# Patient Record
Sex: MALE | Race: OTHER | HISPANIC OR LATINO | URBAN - METROPOLITAN AREA
[De-identification: names, ages, dates, MRNs, and addresses within clinical notes are randomized per-mention and may not be internally consistent; named-entity substitution may affect disease eponyms.]

---
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
## 2022-01-05 ENCOUNTER — APPOINTMENT (OUTPATIENT)
Dept: PSYCHIATRY | Facility: CLINIC | Age: 22
End: 2022-01-05

## 2022-01-11 ENCOUNTER — APPOINTMENT (OUTPATIENT)
Dept: PSYCHIATRY | Facility: CLINIC | Age: 22
End: 2022-01-11

## 2022-01-19 ENCOUNTER — APPOINTMENT (OUTPATIENT)
Dept: PSYCHIATRY | Facility: CLINIC | Age: 22
End: 2022-01-19

## 2022-01-21 ENCOUNTER — OUTPATIENT (OUTPATIENT)
Dept: OUTPATIENT SERVICES | Facility: HOSPITAL | Age: 22
LOS: 1 days | Discharge: HOME | End: 2022-01-21

## 2022-01-21 ENCOUNTER — APPOINTMENT (OUTPATIENT)
Dept: PSYCHIATRY | Facility: CLINIC | Age: 22
End: 2022-01-21

## 2022-01-21 DIAGNOSIS — F20.9 SCHIZOPHRENIA, UNSPECIFIED: ICD-10-CM

## 2022-01-25 ENCOUNTER — APPOINTMENT (OUTPATIENT)
Dept: PSYCHIATRY | Facility: CLINIC | Age: 22
End: 2022-01-25

## 2022-02-01 ENCOUNTER — APPOINTMENT (OUTPATIENT)
Dept: PSYCHIATRY | Facility: CLINIC | Age: 22
End: 2022-02-01

## 2022-02-09 ENCOUNTER — OUTPATIENT (OUTPATIENT)
Dept: ADMINISTRATIVE | Facility: HOSPITAL | Age: 22
LOS: 1 days | Discharge: HOME | End: 2022-02-09

## 2022-02-09 ENCOUNTER — APPOINTMENT (OUTPATIENT)
Dept: PSYCHIATRY | Facility: CLINIC | Age: 22
End: 2022-02-09

## 2022-02-10 ENCOUNTER — APPOINTMENT (OUTPATIENT)
Dept: PSYCHIATRY | Facility: CLINIC | Age: 22
End: 2022-02-10

## 2022-02-14 ENCOUNTER — APPOINTMENT (OUTPATIENT)
Dept: PSYCHIATRY | Facility: CLINIC | Age: 22
End: 2022-02-14

## 2022-02-14 ENCOUNTER — OUTPATIENT (OUTPATIENT)
Dept: OUTPATIENT SERVICES | Facility: HOSPITAL | Age: 22
LOS: 1 days | Discharge: HOME | End: 2022-02-14

## 2022-02-14 DIAGNOSIS — F20.9 SCHIZOPHRENIA, UNSPECIFIED: ICD-10-CM

## 2022-02-15 DIAGNOSIS — F20.9 SCHIZOPHRENIA, UNSPECIFIED: ICD-10-CM

## 2022-02-17 ENCOUNTER — APPOINTMENT (OUTPATIENT)
Dept: PSYCHIATRY | Facility: CLINIC | Age: 22
End: 2022-02-17

## 2022-02-23 ENCOUNTER — APPOINTMENT (OUTPATIENT)
Dept: PSYCHIATRY | Facility: CLINIC | Age: 22
End: 2022-02-23

## 2022-02-28 ENCOUNTER — APPOINTMENT (OUTPATIENT)
Dept: PSYCHIATRY | Facility: CLINIC | Age: 22
End: 2022-02-28

## 2022-03-01 ENCOUNTER — APPOINTMENT (OUTPATIENT)
Dept: PSYCHIATRY | Facility: CLINIC | Age: 22
End: 2022-03-01

## 2022-03-01 ENCOUNTER — OUTPATIENT (OUTPATIENT)
Dept: OUTPATIENT SERVICES | Facility: HOSPITAL | Age: 22
LOS: 1 days | Discharge: HOME | End: 2022-03-01

## 2022-03-01 DIAGNOSIS — F20.9 SCHIZOPHRENIA, UNSPECIFIED: ICD-10-CM

## 2022-03-08 ENCOUNTER — OUTPATIENT (OUTPATIENT)
Dept: OUTPATIENT SERVICES | Facility: HOSPITAL | Age: 22
LOS: 1 days | Discharge: HOME | End: 2022-03-08

## 2022-03-08 ENCOUNTER — APPOINTMENT (OUTPATIENT)
Dept: PSYCHIATRY | Facility: CLINIC | Age: 22
End: 2022-03-08

## 2022-03-08 DIAGNOSIS — F20.9 SCHIZOPHRENIA, UNSPECIFIED: ICD-10-CM

## 2022-03-09 ENCOUNTER — APPOINTMENT (OUTPATIENT)
Dept: PSYCHIATRY | Facility: CLINIC | Age: 22
End: 2022-03-09

## 2022-03-15 ENCOUNTER — APPOINTMENT (OUTPATIENT)
Dept: PSYCHIATRY | Facility: CLINIC | Age: 22
End: 2022-03-15

## 2022-03-16 ENCOUNTER — APPOINTMENT (OUTPATIENT)
Dept: PSYCHIATRY | Facility: CLINIC | Age: 22
End: 2022-03-16

## 2022-03-21 ENCOUNTER — APPOINTMENT (OUTPATIENT)
Dept: PSYCHIATRY | Facility: CLINIC | Age: 22
End: 2022-03-21

## 2022-03-21 ENCOUNTER — OUTPATIENT (OUTPATIENT)
Dept: OUTPATIENT SERVICES | Facility: HOSPITAL | Age: 22
LOS: 1 days | Discharge: HOME | End: 2022-03-21

## 2022-03-21 DIAGNOSIS — F20.9 SCHIZOPHRENIA, UNSPECIFIED: ICD-10-CM

## 2022-03-23 ENCOUNTER — APPOINTMENT (OUTPATIENT)
Dept: PSYCHIATRY | Facility: CLINIC | Age: 22
End: 2022-03-23

## 2022-03-29 ENCOUNTER — APPOINTMENT (OUTPATIENT)
Dept: PSYCHIATRY | Facility: CLINIC | Age: 22
End: 2022-03-29

## 2022-03-30 ENCOUNTER — APPOINTMENT (OUTPATIENT)
Dept: PSYCHIATRY | Facility: CLINIC | Age: 22
End: 2022-03-30

## 2022-03-31 ENCOUNTER — APPOINTMENT (OUTPATIENT)
Dept: PSYCHIATRY | Facility: CLINIC | Age: 22
End: 2022-03-31
Payer: COMMERCIAL

## 2022-03-31 ENCOUNTER — OUTPATIENT (OUTPATIENT)
Dept: OUTPATIENT SERVICES | Facility: HOSPITAL | Age: 22
LOS: 1 days | Discharge: HOME | End: 2022-03-31

## 2022-03-31 DIAGNOSIS — F20.9 SCHIZOPHRENIA, UNSPECIFIED: ICD-10-CM

## 2022-03-31 PROCEDURE — 99214 OFFICE O/P EST MOD 30 MIN: CPT | Mod: 95

## 2022-04-05 ENCOUNTER — OUTPATIENT (OUTPATIENT)
Dept: OUTPATIENT SERVICES | Facility: HOSPITAL | Age: 22
LOS: 1 days | Discharge: HOME | End: 2022-04-05

## 2022-04-05 ENCOUNTER — APPOINTMENT (OUTPATIENT)
Dept: PSYCHIATRY | Facility: CLINIC | Age: 22
End: 2022-04-05

## 2022-04-05 DIAGNOSIS — F20.9 SCHIZOPHRENIA, UNSPECIFIED: ICD-10-CM

## 2022-04-06 ENCOUNTER — APPOINTMENT (OUTPATIENT)
Dept: PSYCHIATRY | Facility: CLINIC | Age: 22
End: 2022-04-06

## 2022-04-12 ENCOUNTER — APPOINTMENT (OUTPATIENT)
Dept: PSYCHIATRY | Facility: CLINIC | Age: 22
End: 2022-04-12

## 2022-04-13 ENCOUNTER — APPOINTMENT (OUTPATIENT)
Dept: PSYCHIATRY | Facility: CLINIC | Age: 22
End: 2022-04-13

## 2022-04-13 ENCOUNTER — NON-APPOINTMENT (OUTPATIENT)
Age: 22
End: 2022-04-13

## 2022-04-14 ENCOUNTER — APPOINTMENT (OUTPATIENT)
Dept: PSYCHIATRY | Facility: CLINIC | Age: 22
End: 2022-04-14

## 2022-04-21 ENCOUNTER — APPOINTMENT (OUTPATIENT)
Dept: PSYCHIATRY | Facility: CLINIC | Age: 22
End: 2022-04-21

## 2022-05-05 ENCOUNTER — APPOINTMENT (OUTPATIENT)
Dept: PSYCHIATRY | Facility: CLINIC | Age: 22
End: 2022-05-05

## 2022-05-06 ENCOUNTER — APPOINTMENT (OUTPATIENT)
Dept: PSYCHIATRY | Facility: CLINIC | Age: 22
End: 2022-05-06

## 2022-05-26 ENCOUNTER — APPOINTMENT (OUTPATIENT)
Dept: PSYCHIATRY | Facility: CLINIC | Age: 22
End: 2022-05-26

## 2022-05-26 ENCOUNTER — OUTPATIENT (OUTPATIENT)
Dept: OUTPATIENT SERVICES | Facility: HOSPITAL | Age: 22
LOS: 1 days | Discharge: HOME | End: 2022-05-26

## 2022-05-26 ENCOUNTER — NON-APPOINTMENT (OUTPATIENT)
Age: 22
End: 2022-05-26

## 2022-05-26 DIAGNOSIS — F20.9 SCHIZOPHRENIA, UNSPECIFIED: ICD-10-CM

## 2022-06-20 ENCOUNTER — APPOINTMENT (OUTPATIENT)
Dept: PSYCHIATRY | Facility: CLINIC | Age: 22
End: 2022-06-20

## 2022-06-20 ENCOUNTER — OUTPATIENT (OUTPATIENT)
Dept: OUTPATIENT SERVICES | Facility: HOSPITAL | Age: 22
LOS: 1 days | Discharge: HOME | End: 2022-06-20

## 2022-06-21 DIAGNOSIS — F20.9 SCHIZOPHRENIA, UNSPECIFIED: ICD-10-CM

## 2022-07-06 ENCOUNTER — NON-APPOINTMENT (OUTPATIENT)
Age: 22
End: 2022-07-06

## 2022-07-06 ENCOUNTER — APPOINTMENT (OUTPATIENT)
Dept: PSYCHIATRY | Facility: CLINIC | Age: 22
End: 2022-07-06

## 2022-07-06 ENCOUNTER — OUTPATIENT (OUTPATIENT)
Dept: OUTPATIENT SERVICES | Facility: HOSPITAL | Age: 22
LOS: 1 days | Discharge: HOME | End: 2022-07-06

## 2022-07-06 DIAGNOSIS — F20.9 SCHIZOPHRENIA, UNSPECIFIED: ICD-10-CM

## 2022-07-06 PROCEDURE — 99214 OFFICE O/P EST MOD 30 MIN: CPT | Mod: 95

## 2022-07-07 ENCOUNTER — OUTPATIENT (OUTPATIENT)
Dept: OUTPATIENT SERVICES | Facility: HOSPITAL | Age: 22
LOS: 1 days | Discharge: HOME | End: 2022-07-07

## 2022-07-07 ENCOUNTER — APPOINTMENT (OUTPATIENT)
Dept: PSYCHIATRY | Facility: CLINIC | Age: 22
End: 2022-07-07

## 2022-07-07 DIAGNOSIS — F20.9 SCHIZOPHRENIA, UNSPECIFIED: ICD-10-CM

## 2022-07-14 ENCOUNTER — OUTPATIENT (OUTPATIENT)
Dept: OUTPATIENT SERVICES | Facility: HOSPITAL | Age: 22
LOS: 1 days | Discharge: HOME | End: 2022-07-14

## 2022-07-14 ENCOUNTER — APPOINTMENT (OUTPATIENT)
Dept: PSYCHIATRY | Facility: CLINIC | Age: 22
End: 2022-07-14

## 2022-07-14 DIAGNOSIS — F20.9 SCHIZOPHRENIA, UNSPECIFIED: ICD-10-CM

## 2022-07-15 ENCOUNTER — APPOINTMENT (OUTPATIENT)
Dept: PSYCHIATRY | Facility: CLINIC | Age: 22
End: 2022-07-15

## 2022-07-15 ENCOUNTER — OUTPATIENT (OUTPATIENT)
Dept: OUTPATIENT SERVICES | Facility: HOSPITAL | Age: 22
LOS: 1 days | Discharge: HOME | End: 2022-07-15

## 2022-07-15 DIAGNOSIS — F20.9 SCHIZOPHRENIA, UNSPECIFIED: ICD-10-CM

## 2022-07-18 ENCOUNTER — APPOINTMENT (OUTPATIENT)
Dept: PSYCHIATRY | Facility: CLINIC | Age: 22
End: 2022-07-18

## 2022-07-18 ENCOUNTER — OUTPATIENT (OUTPATIENT)
Dept: OUTPATIENT SERVICES | Facility: HOSPITAL | Age: 22
LOS: 1 days | Discharge: HOME | End: 2022-07-18

## 2022-07-18 DIAGNOSIS — F20.9 SCHIZOPHRENIA, UNSPECIFIED: ICD-10-CM

## 2022-07-19 ENCOUNTER — NON-APPOINTMENT (OUTPATIENT)
Age: 22
End: 2022-07-19

## 2022-07-20 ENCOUNTER — NON-APPOINTMENT (OUTPATIENT)
Age: 22
End: 2022-07-20

## 2022-07-21 ENCOUNTER — NON-APPOINTMENT (OUTPATIENT)
Age: 22
End: 2022-07-21

## 2022-07-21 ENCOUNTER — APPOINTMENT (OUTPATIENT)
Dept: PSYCHIATRY | Facility: CLINIC | Age: 22
End: 2022-07-21

## 2022-07-25 ENCOUNTER — APPOINTMENT (OUTPATIENT)
Dept: PSYCHIATRY | Facility: CLINIC | Age: 22
End: 2022-07-25

## 2022-07-28 ENCOUNTER — OUTPATIENT (OUTPATIENT)
Dept: OUTPATIENT SERVICES | Facility: HOSPITAL | Age: 22
LOS: 1 days | Discharge: HOME | End: 2022-07-28

## 2022-07-28 ENCOUNTER — APPOINTMENT (OUTPATIENT)
Dept: PSYCHIATRY | Facility: CLINIC | Age: 22
End: 2022-07-28

## 2022-07-28 DIAGNOSIS — F20.9 SCHIZOPHRENIA, UNSPECIFIED: ICD-10-CM

## 2022-07-28 PROCEDURE — 99214 OFFICE O/P EST MOD 30 MIN: CPT | Mod: 95,GC

## 2022-07-29 ENCOUNTER — OUTPATIENT (OUTPATIENT)
Dept: OUTPATIENT SERVICES | Facility: HOSPITAL | Age: 22
LOS: 1 days | Discharge: HOME | End: 2022-07-29

## 2022-07-29 ENCOUNTER — APPOINTMENT (OUTPATIENT)
Dept: PSYCHIATRY | Facility: CLINIC | Age: 22
End: 2022-07-29

## 2022-07-29 DIAGNOSIS — F20.9 SCHIZOPHRENIA, UNSPECIFIED: ICD-10-CM

## 2022-08-02 ENCOUNTER — APPOINTMENT (OUTPATIENT)
Dept: PSYCHIATRY | Facility: CLINIC | Age: 22
End: 2022-08-02

## 2022-08-04 ENCOUNTER — OUTPATIENT (OUTPATIENT)
Dept: OUTPATIENT SERVICES | Facility: HOSPITAL | Age: 22
LOS: 1 days | Discharge: HOME | End: 2022-08-04

## 2022-08-04 ENCOUNTER — APPOINTMENT (OUTPATIENT)
Dept: PSYCHIATRY | Facility: CLINIC | Age: 22
End: 2022-08-04

## 2022-08-04 DIAGNOSIS — F20.9 SCHIZOPHRENIA, UNSPECIFIED: ICD-10-CM

## 2022-08-08 ENCOUNTER — APPOINTMENT (OUTPATIENT)
Dept: PSYCHIATRY | Facility: CLINIC | Age: 22
End: 2022-08-08

## 2022-08-11 ENCOUNTER — APPOINTMENT (OUTPATIENT)
Dept: PSYCHIATRY | Facility: CLINIC | Age: 22
End: 2022-08-11

## 2022-08-11 ENCOUNTER — OUTPATIENT (OUTPATIENT)
Dept: OUTPATIENT SERVICES | Facility: HOSPITAL | Age: 22
LOS: 1 days | Discharge: HOME | End: 2022-08-11

## 2022-08-11 PROCEDURE — 99214 OFFICE O/P EST MOD 30 MIN: CPT

## 2022-08-11 RX ORDER — OLANZAPINE 5 MG/1
5 TABLET, FILM COATED ORAL DAILY
Qty: 30 | Refills: 0 | Status: DISCONTINUED | COMMUNITY
Start: 2022-02-09 | End: 2022-08-11

## 2022-08-11 RX ORDER — FLUOXETINE HYDROCHLORIDE 20 MG/1
20 CAPSULE ORAL DAILY
Qty: 30 | Refills: 0 | Status: DISCONTINUED | COMMUNITY
Start: 2022-02-09 | End: 2022-08-11

## 2022-08-12 ENCOUNTER — APPOINTMENT (OUTPATIENT)
Dept: PSYCHIATRY | Facility: CLINIC | Age: 22
End: 2022-08-12

## 2022-08-12 DIAGNOSIS — F20.9 SCHIZOPHRENIA, UNSPECIFIED: ICD-10-CM

## 2022-08-15 ENCOUNTER — APPOINTMENT (OUTPATIENT)
Dept: PSYCHIATRY | Facility: CLINIC | Age: 22
End: 2022-08-15

## 2022-08-16 ENCOUNTER — APPOINTMENT (OUTPATIENT)
Dept: PSYCHIATRY | Facility: CLINIC | Age: 22
End: 2022-08-16

## 2022-08-16 ENCOUNTER — OUTPATIENT (OUTPATIENT)
Dept: OUTPATIENT SERVICES | Facility: HOSPITAL | Age: 22
LOS: 1 days | Discharge: HOME | End: 2022-08-16

## 2022-08-16 DIAGNOSIS — F20.9 SCHIZOPHRENIA, UNSPECIFIED: ICD-10-CM

## 2022-08-18 ENCOUNTER — APPOINTMENT (OUTPATIENT)
Dept: PSYCHIATRY | Facility: CLINIC | Age: 22
End: 2022-08-18

## 2022-08-19 ENCOUNTER — APPOINTMENT (OUTPATIENT)
Dept: PSYCHIATRY | Facility: CLINIC | Age: 22
End: 2022-08-19

## 2022-08-22 ENCOUNTER — APPOINTMENT (OUTPATIENT)
Dept: PSYCHIATRY | Facility: CLINIC | Age: 22
End: 2022-08-22

## 2022-08-25 ENCOUNTER — APPOINTMENT (OUTPATIENT)
Dept: PSYCHIATRY | Facility: CLINIC | Age: 22
End: 2022-08-25

## 2022-08-31 ENCOUNTER — APPOINTMENT (OUTPATIENT)
Dept: PSYCHIATRY | Facility: CLINIC | Age: 22
End: 2022-08-31

## 2022-08-31 ENCOUNTER — OUTPATIENT (OUTPATIENT)
Dept: OUTPATIENT SERVICES | Facility: HOSPITAL | Age: 22
LOS: 1 days | Discharge: HOME | End: 2022-08-31

## 2022-08-31 DIAGNOSIS — F20.9 SCHIZOPHRENIA, UNSPECIFIED: ICD-10-CM

## 2022-09-01 ENCOUNTER — APPOINTMENT (OUTPATIENT)
Dept: PSYCHIATRY | Facility: CLINIC | Age: 22
End: 2022-09-01

## 2022-09-08 ENCOUNTER — APPOINTMENT (OUTPATIENT)
Dept: PSYCHIATRY | Facility: CLINIC | Age: 22
End: 2022-09-08

## 2022-09-13 ENCOUNTER — OUTPATIENT (OUTPATIENT)
Dept: OUTPATIENT SERVICES | Facility: HOSPITAL | Age: 22
LOS: 1 days | Discharge: HOME | End: 2022-09-13

## 2022-09-13 ENCOUNTER — APPOINTMENT (OUTPATIENT)
Dept: PSYCHIATRY | Facility: CLINIC | Age: 22
End: 2022-09-13

## 2022-09-13 DIAGNOSIS — F20.9 SCHIZOPHRENIA, UNSPECIFIED: ICD-10-CM

## 2022-09-15 ENCOUNTER — APPOINTMENT (OUTPATIENT)
Dept: PSYCHIATRY | Facility: CLINIC | Age: 22
End: 2022-09-15

## 2022-09-16 ENCOUNTER — OUTPATIENT (OUTPATIENT)
Dept: OUTPATIENT SERVICES | Facility: HOSPITAL | Age: 22
LOS: 1 days | Discharge: HOME | End: 2022-09-16

## 2022-09-16 ENCOUNTER — APPOINTMENT (OUTPATIENT)
Dept: PSYCHIATRY | Facility: CLINIC | Age: 22
End: 2022-09-16

## 2022-09-16 DIAGNOSIS — F20.9 SCHIZOPHRENIA, UNSPECIFIED: ICD-10-CM

## 2022-09-16 PROCEDURE — XXXXX: CPT | Mod: 1L

## 2022-09-22 ENCOUNTER — APPOINTMENT (OUTPATIENT)
Dept: PSYCHIATRY | Facility: CLINIC | Age: 22
End: 2022-09-22

## 2022-09-22 ENCOUNTER — OUTPATIENT (OUTPATIENT)
Dept: OUTPATIENT SERVICES | Facility: HOSPITAL | Age: 22
LOS: 1 days | Discharge: HOME | End: 2022-09-22

## 2022-09-22 DIAGNOSIS — F20.9 SCHIZOPHRENIA, UNSPECIFIED: ICD-10-CM

## 2022-09-26 ENCOUNTER — APPOINTMENT (OUTPATIENT)
Dept: PSYCHIATRY | Facility: CLINIC | Age: 22
End: 2022-09-26

## 2022-09-28 ENCOUNTER — APPOINTMENT (OUTPATIENT)
Dept: PSYCHIATRY | Facility: CLINIC | Age: 22
End: 2022-09-28

## 2022-10-06 ENCOUNTER — APPOINTMENT (OUTPATIENT)
Dept: PSYCHIATRY | Facility: CLINIC | Age: 22
End: 2022-10-06

## 2022-10-07 ENCOUNTER — APPOINTMENT (OUTPATIENT)
Dept: PSYCHIATRY | Facility: CLINIC | Age: 22
End: 2022-10-07

## 2022-10-11 NOTE — END OF VISIT
[Individual Psychotherapy for 16-37 minutes] : Individual Psychotherapy for 16-37 minutes [Teletherapy Service Provided] : The services provided in this session were delivered via tele-therapy [FreeTextEntry3] : Home [FreeTextEntry5] : [address] [place] [address]

## 2022-10-11 NOTE — REASON FOR VISIT
[Home] : at home, [unfilled] , at the time of the visit. [Medical Office: ([facility])___] : at the medical office located in  [Patient] : Patient [FreeTextEntry1] : Supportive employment and educational services.\par

## 2022-10-11 NOTE — PLAN
[FreeTextEntry2] : To maintain employment and reach educational goals. 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 SEES followed up with Shakila regarding work and school. Shakila reported that work and school has been going well. Shakila reported that he has been getting hours weekly at work and making very good money. Shakila reported that school is interesting and has been enjoying what he has been learning, adding that the classes have been hands on. Shakila reported that he learned how to build a cable which was fascinating to him. The pace at which the teacher has been going has been good for Shakila and feels he is going at a perfect speed for which he can learn at. Shakila continues to check in with the SEES. [Recommended Frequency of Visits: ____] : Recommended frequency of visits: [unfilled] [Return in ____ week(s)] : Return in [unfilled] week(s) [FreeTextEntry1] : Shakila will meet with the SEES again on 10/14.

## 2022-10-13 ENCOUNTER — APPOINTMENT (OUTPATIENT)
Dept: PSYCHIATRY | Facility: CLINIC | Age: 22
End: 2022-10-13

## 2022-10-14 ENCOUNTER — APPOINTMENT (OUTPATIENT)
Dept: PSYCHIATRY | Facility: CLINIC | Age: 22
End: 2022-10-14

## 2022-10-17 ENCOUNTER — APPOINTMENT (OUTPATIENT)
Dept: PSYCHIATRY | Facility: CLINIC | Age: 22
End: 2022-10-17

## 2022-10-17 ENCOUNTER — OUTPATIENT (OUTPATIENT)
Dept: OUTPATIENT SERVICES | Facility: HOSPITAL | Age: 22
LOS: 1 days | Discharge: HOME | End: 2022-10-17

## 2022-10-17 DIAGNOSIS — F20.9 SCHIZOPHRENIA, UNSPECIFIED: ICD-10-CM

## 2022-10-17 NOTE — REASON FOR VISIT
[Home] : at home, [unfilled] , at the time of the visit. [Medical Office: ([facility])___] : at the medical office located in  [Self] : self [Patient] : Patient

## 2022-10-17 NOTE — END OF VISIT
[Duration of Psychotherapy Visit (minutes spent in synchronous communication): ____] : Duration of Psychotherapy Visit (minutes spent in synchronous communication): [unfilled] [Individual Psychotherapy for 38-52 minutes] : Individual Psychotherapy for 38 - 52 minutes [Teletherapy Service Provided] : The services provided in this session were delivered via tele-therapy [FreeTextEntry3] : home [FreeTextEntry5] : [address] [place] [address]

## 2022-10-17 NOTE — PLAN
[FreeTextEntry2] : Work, school and personal goals.   [Interpersonal Therapy] : Interpersonal Therapy  [de-identified] : Met with Shakila via [employer] for session.  The session focused on Shakila's accomplishments over the last month.  Shakila stated he is very happy and feels his life is full.  Shakila states he is enjoying both school and work. Shakila has found some time for fun.  He reported seeing an ex girlfriend for dinner.  He stated he enjoyed going out with her but struggles with painful feelings from when their relationship ended.  Shakila stated what he finds most frustrating that she doesn't acknowledge his feelings.  Shakila spoke about intimate relationships and currently he states he doesn't feel ready.  PC asked Shakila to consider that as he gets older women will be more mature and dating can be a positive experience.  Shakila acknowledged this and states that for now he is content with playing video games and hanging around his friends and family.  Shakila states that he feels well both emotionally and physically and this has allowed him to see the people around him as treasures.   [Recommended Frequency of Visits: ____] : Recommended frequency of visits: [unfilled] [Return in ____ week(s)] : Return in [unfilled] week(s) [FreeTextEntry1] : Next session to be scheduled.

## 2022-10-17 NOTE — PHYSICAL EXAM
[Well groomed] : well groomed [Cooperative] : cooperative [Euthymic] : euthymic [Flat] : flat [Clear] : clear [Linear/Goal Directed] : linear/goal directed [Average] : average [WNL] : within normal limits

## 2022-10-19 NOTE — REASON FOR VISIT
[Other Location: e.g. School (Enter Location, City,State)___] : at [unfilled], at the time of the visit. [Medical Office: ([facility])___] : at the medical office located in  [Patient] : Patient [FreeTextEntry1] : Supportive employment and educational services.\par

## 2022-10-19 NOTE — PLAN
[FreeTextEntry2] : To maintain employment and reach educational goals.  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 SEES checked in with Shakila regarding school and work. Shakila reported that school has been going well, he has been setting aside time to go over the chapter readings to prepare for class and plans on studying for an upcoming exam. SEES discussed ways of studying, identifying study techniques on how to be successful with studying. Shakila reported that he feels good about what he has been learning and being it has been hands on has allowed him to fully be emerged in the topic and learn. Shakila continues to work and has been getting more hours, has been enjoying his job. Shakila is satisfied with how school and work has been going. [Recommended Frequency of Visits: ____] : Recommended frequency of visits: [unfilled] [Return in ____ week(s)] : Return in [unfilled] week(s) [FreeTextEntry1] : Shakila will check back in with the SEES again on 10/21.

## 2022-10-19 NOTE — END OF VISIT
[Individual Psychotherapy for 16-37 minutes] : Individual Psychotherapy for 16-37 minutes [Teletherapy Service Provided] : The services provided in this session were delivered via tele-therapy [FreeTextEntry3] : in the community [FreeTextEntry5] : [address] [place] [address]

## 2022-10-27 ENCOUNTER — APPOINTMENT (OUTPATIENT)
Dept: PSYCHIATRY | Facility: CLINIC | Age: 22
End: 2022-10-27

## 2022-10-28 ENCOUNTER — APPOINTMENT (OUTPATIENT)
Dept: PSYCHIATRY | Facility: CLINIC | Age: 22
End: 2022-10-28

## 2022-10-31 ENCOUNTER — APPOINTMENT (OUTPATIENT)
Dept: PSYCHIATRY | Facility: CLINIC | Age: 22
End: 2022-10-31

## 2022-10-31 ENCOUNTER — OUTPATIENT (OUTPATIENT)
Dept: OUTPATIENT SERVICES | Facility: HOSPITAL | Age: 22
LOS: 1 days | Discharge: HOME | End: 2022-10-31

## 2022-10-31 DIAGNOSIS — F20.9 SCHIZOPHRENIA, UNSPECIFIED: ICD-10-CM

## 2022-10-31 NOTE — END OF VISIT
[Duration of Psychotherapy Visit (minutes spent in synchronous communication): ____] : Duration of Psychotherapy Visit (minutes spent in synchronous communication): [unfilled] [Teletherapy Service Provided] : The services provided in this session were delivered via tele-therapy [Individual Psychotherapy for 38-52 minutes] : Individual Psychotherapy for 38 - 52 minutes [FreeTextEntry3] : home [FreeTextEntry5] : [address] [place] [address]

## 2022-10-31 NOTE — PHYSICAL EXAM
[Well groomed] : well groomed [Cooperative] : cooperative [Depressed] : depressed [Flat] : flat [Clear] : clear [Linear/Goal Directed] : linear/goal directed [None] : none [None Reported] : none reported [Average] : average [WNL] : within normal limits [FreeTextEntry8] : cousin was dx with a brain tumor and seizures, family friend passed away last week

## 2022-10-31 NOTE — PLAN
[FreeTextEntry2] : Shakila has been able to demonstrate that he can identify and speak about his emotions in a healthy manner.   [Interpersonal Therapy] : Interpersonal Therapy  [Psychodynamic Therapy] : Psychodynamic Therapy  [de-identified] : Met with Sahkila via [employer] for session.  The session focused on the events of the past week and what affect they had on Shakila.  Shakila has identified feelings of loss as his former boss and family friend passed away at the age of 61.  Shakila states when these things occur he thinks about the mortality of his family, especially his mother who has multiple health issues.  Shakila states he would like to strengthen his relationships with his parent's.  He shared that it made him think about what his parent's have done for him and how he would like to give back to them.  Shakila stated he gave his mother some money from his last check to show her that he appreciates the times she has bailed him out of trouble.  Shakila explained that in addition to the death of the family friend his cousin was diagnosed with a brain tumor and is having seizures.  Shakila states he is like a big brother to him throughout his life.  Shakila admits this has made him realize the importance of this relationship and how he has helped him when he was dealing with his mental health crisis.  Shakila states he wants to be there for his cousin and is willing to do whatever is needed to support him.  Shakila identified multiple emotions he has been going through and the importance of speaking about and acting to support his family and friends.  Shakila states work and school are going well and he has been abstinent from Marijuana and feeling well.   [Recommended Frequency of Visits: ____] : Recommended frequency of visits: [unfilled] [Return in ____ week(s)] : Return in [unfilled] week(s) [FreeTextEntry1] : Next session to be scheduled,

## 2022-11-04 ENCOUNTER — APPOINTMENT (OUTPATIENT)
Dept: PSYCHIATRY | Facility: CLINIC | Age: 22
End: 2022-11-04

## 2022-11-08 NOTE — END OF VISIT
[Individual Psychotherapy for 38-52 minutes] : Individual Psychotherapy for 38 - 52 minutes [Teletherapy Service Provided] : The services provided in this session were delivered via tele-therapy [FreeTextEntry3] : at cousins home. [FreeTextEntry5] : [address] [place] [address]

## 2022-11-08 NOTE — PLAN
[FreeTextEntry2] : To maintain employment and reach educational goals.  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 SEES checked in with Shakila regarding any updates and progress both in school and working. Shakila reported that he continues to work and attend school. Shakila reported that he received a poor grade on his exam in school scoring a 53. Shakila expressed feeling disappointed however, his teacher mentioned that he can retake it and that the future exams would not be this difficult. SEES explored how Shakila was studying for the exam and discovered that he was using the quizlet resource to help with terminology. Shakila reported that he needed to reach out to the school for a retest. SEES discussed with Shakila his eligibility to ask for reasonable accommodations. Shakila was open to this and open to SEES reaching out to the school to reschedule his exam for him however, expressed how he wanted the SEES to wait because he was not ready to retake it just yet. Shakila reported feeling some heaviness this week due to a death of a family friend and a cousin who was not well.   [Recommended Frequency of Visits: ____] : Recommended frequency of visits: [unfilled] [Return in ____ week(s)] : Return in [unfilled] week(s) [FreeTextEntry1] : Shakila will meet with the SEES again on 11/11.

## 2022-11-08 NOTE — REASON FOR VISIT
[Other Location: e.g. School (Enter Location, City,State)___] : at [unfilled], at the time of the visit. [Medical Office: ([facility])___] : at the medical office located in  [Patient] : Patient [FreeTextEntry1] : Supportive employment and educational services.\par

## 2022-11-11 ENCOUNTER — APPOINTMENT (OUTPATIENT)
Dept: PSYCHIATRY | Facility: CLINIC | Age: 22
End: 2022-11-11

## 2022-11-21 ENCOUNTER — APPOINTMENT (OUTPATIENT)
Dept: PSYCHIATRY | Facility: CLINIC | Age: 22
End: 2022-11-21

## 2022-11-22 ENCOUNTER — APPOINTMENT (OUTPATIENT)
Dept: PSYCHIATRY | Facility: CLINIC | Age: 22
End: 2022-11-22

## 2022-12-01 ENCOUNTER — APPOINTMENT (OUTPATIENT)
Dept: PSYCHIATRY | Facility: CLINIC | Age: 22
End: 2022-12-01

## 2022-12-02 NOTE — PLAN
[FreeTextEntry2] : To obtain employment and reach educational goals.\par  [Skills training (all types)] : Skills training (all types)  [Other: ____] : [unfilled] [de-identified] : Shakila continues to engage weekly with the SEES while working on his goals which is to obtain employment and achieve educational goals. Shakila reported that he continues to attend school and is doing well. Shakila continues to request exploring work options. Shakila reported interest in working for [employer]. SEES assisted Shakila with completing an application with [employer] and also explore other available employment options with him. SEES observed that [employer] was hiring and was able to schedule Shakila for an interview which will take place on 12/5 @ 3pm at the [address] [place] [address] location. 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 SEES offered to provide Shakila with some tips and practiced some strategies with him. Shakila mentioned that he feels ready for the interview and would utilize what he went over with the SEES. [Recommended Frequency of Visits: ____] : Recommended frequency of visits: [unfilled] [Return in ____ week(s)] : Return in [unfilled] week(s) [FreeTextEntry1] : Shakila will meet with the SEES again on 12/6.

## 2022-12-02 NOTE — END OF VISIT
[Individual Psychotherapy for 38-52 minutes] : Individual Psychotherapy for 38 - 52 minutes [Teletherapy Service Provided] : The services provided in this session were delivered via tele-therapy [FreeTextEntry3] : Home [FreeTextEntry5] : [address] [place] [address]

## 2022-12-02 NOTE — REASON FOR VISIT
[Home] : at home, [unfilled] , at the time of the visit. [Medical Office: ([facility])___] : at the medical office located in  [Patient] : Patient [FreeTextEntry1] : Supportive employment and educational services.\par

## 2022-12-06 ENCOUNTER — APPOINTMENT (OUTPATIENT)
Dept: PSYCHIATRY | Facility: CLINIC | Age: 22
End: 2022-12-06

## 2022-12-15 ENCOUNTER — APPOINTMENT (OUTPATIENT)
Dept: PSYCHIATRY | Facility: CLINIC | Age: 22
End: 2022-12-15

## 2022-12-29 ENCOUNTER — OUTPATIENT (OUTPATIENT)
Dept: OUTPATIENT SERVICES | Facility: HOSPITAL | Age: 22
LOS: 1 days | Discharge: HOME | End: 2022-12-29

## 2022-12-29 ENCOUNTER — APPOINTMENT (OUTPATIENT)
Dept: PSYCHIATRY | Facility: CLINIC | Age: 22
End: 2022-12-29

## 2022-12-29 DIAGNOSIS — F20.9 SCHIZOPHRENIA, UNSPECIFIED: ICD-10-CM

## 2023-01-03 NOTE — END OF VISIT
[Individual Psychotherapy for 53+ minutes] : Individual Psychotherapy for 53+ minutes  [Teletherapy Service Provided] : The services provided in this session were delivered via tele-therapy [FreeTextEntry3] : Home [FreeTextEntry5] : [address] [place] [address]

## 2023-01-03 NOTE — PLAN
[FreeTextEntry2] : To obtain employment and reach educational goals.\par  [Skills training (all types)] : Skills training (all types)  [Other: ____] : [unfilled] [de-identified] : Shakila continues to meet with the SEES weekly while working on his goals which is to obtain employment. Shakila reported to the SEES that he no longer is enrolled in school because he needed to take care of his cousin who is sick. Shakila reported that his primary goal right now was to find work, requesting assistance from the SEES on applying to places of interest. SEES was able to assist Shakila with applications and suggested a few places of employment for him as well. Shakila found a place that was hiring and was given an interview date for 1/5. Shakila was interested in attending the interview and will practice interviewing skills if he is interested next week.  [Recommended Frequency of Visits: ____] : Recommended frequency of visits: [unfilled] [Return in ____ week(s)] : Return in [unfilled] week(s) [FreeTextEntry1] : Shakila will meet with the SEES on 1/6.

## 2023-01-03 NOTE — REASON FOR VISIT
[Home] : at home, [unfilled] , at the time of the visit. [Medical Office: ([facility])___] : at the medical office located in  [Patient] : Patient [FreeTextEntry1] : Supportive employment and educational services.\par

## 2023-01-06 ENCOUNTER — APPOINTMENT (OUTPATIENT)
Dept: PSYCHIATRY | Facility: CLINIC | Age: 23
End: 2023-01-06

## 2023-01-06 ENCOUNTER — NON-APPOINTMENT (OUTPATIENT)
Age: 23
End: 2023-01-06

## 2023-01-13 ENCOUNTER — APPOINTMENT (OUTPATIENT)
Dept: PSYCHIATRY | Facility: CLINIC | Age: 23
End: 2023-01-13

## 2023-01-17 NOTE — PLAN
[FreeTextEntry2] : To obtain employment.\par  [Skills training (all types)] : Skills training (all types)  [Other: ____] : [unfilled] [de-identified] : Shakila continues to engage weekly with the SEES for assistance with his main goal which is to obtain employment. Shakila reported that he continues to explore work options on his down time with no avail that lead to any potential interviews. Shakila continues to express his desire to explore more work options with the SEES. Shakila reports wanting any kind of job however, SEES continues to explore what this means to him, providing options and discussion around each opportunity to make certain that SEES is matching Shakila to appropriate job opportunity. Shakila was able to apply for some jobs with the assistance from the SEES.  [Recommended Frequency of Visits: ____] : Recommended frequency of visits: [unfilled] [Return in ____ week(s)] : Return in [unfilled] week(s) [FreeTextEntry1] : Shakila will meet with the SEES again on 1/18.

## 2023-01-17 NOTE — END OF VISIT
[Individual Psychotherapy for 53+ minutes] : Individual Psychotherapy for 53+ minutes  [Teletherapy Service Provided] : The services provided in this session were delivered via tele-therapy [FreeTextEntry3] : Home [FreeTextEntry5] : [address] [place] [address]

## 2023-01-17 NOTE — REASON FOR VISIT
[Patient preference] : as per patient preference [Continuing, patient not seen in-person within last 12 months (provide details below)] : Telehealth services are continuing, patient not seen in-person within last 12 months.  [Telehealth (audio & video) - Individual/Group] : This visit was provided via telehealth using real-time 2-way audio visual technology. [Medical Office: ([facility])___] : The provider was located at the medical office in [unfilled]. [Home] : The patient, [unfilled], was located at home, [unfilled], at the time of the visit. [Verbal consent obtained from patient/other participant(s)] : Verbal consent for telehealth/telephonic services obtained from patient/other participant(s) [FreeTextEntry4] : 2:05pm [FreeTextEntry5] : 3:09pm [FreeTextEntry3] : Pt only met with SEES virtually. [Patient] : Patient [FreeTextEntry1] : Supportive employment and educational services.\par

## 2023-01-18 ENCOUNTER — APPOINTMENT (OUTPATIENT)
Dept: PSYCHIATRY | Facility: CLINIC | Age: 23
End: 2023-01-18

## 2023-01-25 ENCOUNTER — APPOINTMENT (OUTPATIENT)
Dept: PSYCHIATRY | Facility: CLINIC | Age: 23
End: 2023-01-25
Payer: COMMERCIAL

## 2023-01-25 ENCOUNTER — OUTPATIENT (OUTPATIENT)
Dept: OUTPATIENT SERVICES | Facility: HOSPITAL | Age: 23
LOS: 1 days | Discharge: HOME | End: 2023-01-25

## 2023-01-25 DIAGNOSIS — F20.9 SCHIZOPHRENIA, UNSPECIFIED: ICD-10-CM

## 2023-01-25 PROCEDURE — 99214 OFFICE O/P EST MOD 30 MIN: CPT | Mod: 95

## 2023-01-25 NOTE — SOCIAL HISTORY
[FreeTextEntry1] : Pt reports he was [place] for having thoughts of not wanting to be alive. See previous history/records.\par \par recently admitted at EOB at [facility] : 7/19/22-7/21/22 after he presented himself to the ED for bruising. While he was in the EOB he was started on Depakene 1500mg at bedtime and Olanzapine was increased to 20mg at bedtime. \par \par \par history of cannabis use disorder\par current use of nicotine vaporizer, reports cutting back\par Drinks alcohol socially and not more than two drinks at a time.

## 2023-01-25 NOTE — DISCUSSION/SUMMARY
[FreeTextEntry1] : Shakila Pacheco is 21 years old, , English speaking, single , currently employed, domiciled with his mother, with a history of having been diagnosed with schizophrenia. He was referred to [facility] program after his first IPP in [facility] for severe mood episode with psychotic features with resolved with addition of Olanzapine . Per the intake record, there might be another depressive episode that patient is experienced few months prior to his admission to [facility] ( reported by mother- depressed, withdwarn, excessive sleep and low appetite) Patient was diagnosed with Schizophreniform disorder and now given the duration of symptoms meets criteria for schizophrenia , however, considering his family history and his clinical presentation, Bipolar 1 disorder cannot be excluded at this time. Patient demonstrated relatively good control of active psychotic and mood symptoms on olanzapine 20 mg but complained of feeling very tired during the day and expressed concerns over increased appetite, tolerating lower Olanzapine 15 mg qhs without increase in mood or psychotic symptoms. He expressed a desire to decrease the Olanzapine to 10mg today and reported having discussed this with his mother. \par \par On evaluation today, pt is requesting to stop Depakene due to excess sleeping and daytime sedation since last encounter. Today, through shared decision-making, we agreed to stop Depakote due to weight gain and day-time sedation. Will also lower Olanzapine to address the daytime sedation. May consider starting SSRI if patients low energy continues and if his mood worsens. Today he is denying low mood associated with his vegetative symptoms. He denied thoughts of suicide or not wanting to be alive. He denied symptoms of psychosis. He reported abstaining from cannabis use. \par

## 2023-01-25 NOTE — PHYSICAL EXAM
[Cooperative] : cooperative [Euthymic] : euthymic [Full] : full [Clear] : clear [Linear/Goal Directed] : linear/goal directed [Average] : average [WNL] : within normal limits [FreeTextEntry8] : "OK."

## 2023-01-25 NOTE — HISTORY OF PRESENT ILLNESS
[FreeTextEntry1] : Shakila was seen for psychiatric follow up. His mother was present for the session and was able to provide collateral. Shakila reported his mood as been "OK." He reported feeling tired all day and being unable to go to the gym which he used to enjoy doing. He reported staying in bed throughout the day. He denied feeling depressed. He reported not being as bothered by his weight gain stating he used to think he was too skinny. He denied symptoms of psychosis such as audio or visual hallucinations, paranoid thoughts, or feeling suspicious. He denied any thoughts of not wanting to be alive. \par \par Per mother: Patient is staying in bed most of the day. She reports the patient has met with a nutritionist and is attempting to go to the gym but is often staying in bed and reporting having no energy. She reported his current weight is 214lbs and has been as low as 165lbs in the past. She reports he has not been exhibiting any strange behavior or anxiety which he has exhibited in the past.  [FreeTextEntry2] : Pt reports he was previously hospitalized [place] for having thoughts of not wanting to be alive. See previous history/records.\par \par recently admitted at EOB at [facility] : 7/19/22-7/21/22 after he presented himself to the ED for bruising. While he was in the EOB he was started on Depakene 1500mg at bedtime and Olanzapine was increased to 20mg at bedtime. \par

## 2023-01-25 NOTE — REASON FOR VISIT
[Patient preference] : as per patient preference [Telehealth (audio & video) - Individual/Group] : This visit was provided via telehealth using real-time 2-way audio visual technology. [Medical Office: ([facility])___] : The provider was located at the medical office in [unfilled]. [Home] : The patient, [unfilled], was located at home, [unfilled], at the time of the visit. [Verbal consent obtained from patient/other participant(s)] : Verbal consent for telehealth/telephonic services obtained from patient/other participant(s) [FreeTextEntry4] : 11am [FreeTextEntry5] : 11:30am [Patient] : Patient [Mother] : mother [FreeTextEntry1] : "I'm OK"

## 2023-01-25 NOTE — PLAN
[Medication education provided] : Medication education provided. [Rationale for medication choices, possible risks/precautions, benefits, alternative treatment choices, and consequences of non-treatment discussed] : Rationale for medication choices, possible risks/precautions, benefits, alternative treatment choices, and consequences of non-treatment discussed with patient/family/caregiver  [FreeTextEntry4] : Goal 1: Prevent reoccurrence of psychosis and maintain health. \par Goal keyword or goal statement: identify antecedents to psychosis Assessment of progress on goal/objective: Shakila has identified that stress of dating has been a trigger to feelings of depression and isolation. He is currently involved in a relationship and he states this has been "good for me." However, there are other girls that are interested in him and this makes balancing his work and personal life difficult and stressful. 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 Jesenia, Shakila's mother is no longer able to attend the family support meetings because she has returned to working in her office full-time. Jesenia remains involved and reaches out to the team when needed. Shakila states he is struggling with incorporating physical exercise into his life. Shakila is no longer considering a gym membership as he has a physically demanding job. ANTIONETTE and Shakila discussed maintaining contact with the team to add to consistency of his treatment.\par  [FreeTextEntry5] : #). Start Metformin 500mg XR, daily with plan to titrate as needed to address weight loss\par #). Reduce Olanzapine 20mg to 15mg daily to address low energy and feeling sedated during the day\par #). Stop Depakote 250 as this was the plan to ween off. This is due to daytime sedation and weight gain\par #). MI used to foster motivation for recovery\par #). Continue with [facility] therapist and SEES

## 2023-01-26 ENCOUNTER — APPOINTMENT (OUTPATIENT)
Dept: PSYCHIATRY | Facility: CLINIC | Age: 23
End: 2023-01-26

## 2023-01-26 ENCOUNTER — OUTPATIENT (OUTPATIENT)
Dept: OUTPATIENT SERVICES | Facility: HOSPITAL | Age: 23
LOS: 1 days | Discharge: HOME | End: 2023-01-26

## 2023-01-26 DIAGNOSIS — F20.9 SCHIZOPHRENIA, UNSPECIFIED: ICD-10-CM

## 2023-01-30 ENCOUNTER — APPOINTMENT (OUTPATIENT)
Dept: PSYCHIATRY | Facility: CLINIC | Age: 23
End: 2023-01-30

## 2023-01-30 ENCOUNTER — OUTPATIENT (OUTPATIENT)
Dept: OUTPATIENT SERVICES | Facility: HOSPITAL | Age: 23
LOS: 1 days | Discharge: HOME | End: 2023-01-30

## 2023-01-30 DIAGNOSIS — F20.9 SCHIZOPHRENIA, UNSPECIFIED: ICD-10-CM

## 2023-01-31 NOTE — END OF VISIT
[Duration of Psychotherapy Visit (minutes spent in synchronous communication): ____] : Duration of Psychotherapy Visit (minutes spent in synchronous communication): [unfilled] [Individual Psychotherapy for 38-52 minutes] : Individual Psychotherapy for 38 - 52 minutes [Teletherapy Service Provided] : The services provided in this session were delivered via tele-therapy [FreeTextEntry3] : home [FreeTextEntry5] : [address] [place] [address]

## 2023-01-31 NOTE — PLAN
[FreeTextEntry2] : Shakila was discussing the type of relationship he wants to have with others, especially mom.   [Interpersonal Therapy] : Interpersonal Therapy  [de-identified] : Met with Shakila via telephonic means as we had difficulty connecting video with 2 different platforms.  Shakila had reached out to  on Sunday for an appointment to discuss feeling he was experiencing when he was thinking about his family and his future.  Shakila verbalized that he experiences feelings of guilt and shame when he recalls how he has treated his mom, especially during his teenage years.   normalized those feelings and made Shakila aware that many people experience those feelings especially in early adulthood.  Shakila shared some things he has done that he feels were hurtful to his mother,  He states that he worries because she has health issues.  He states he feels as if the stress may shorten her life.   reminded Shakila that his mom is responsible for her self-care and as adults we need to decide these thing for ourselves.  Shakila would like to talk with his mother about his thoughts and feelings.   encouraged Shakila to do so.  Shakila stated he was thinking about his future and was feeling very stuck as he notes that his friends are in college and have careers and he hasn't progressed,  normalized this feeling and asked him to consider what his experiences have taught him.  Shakila was open to this however, expressed that the diagnosis and disease have affected him in a negative way.  He shared that he feels as if he would have been in college and on his road to a career.  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  He notes that he quit the school because he felt he couldn't be there for his cousin who is currently is battling brain cancer.  When explored further Shakila realized he quit trade school for other reasons.  He stated he was afraid he would fail the class.  Shakila stated that he has done this before and now realizes that the diagnosis is not the only thing that he feel is holding him back.  Shakila was receptive to feedback and has demonstrated good progress as he is becoming more open about his thoughts and feelings.   [Recommended Frequency of Visits: ____] : Recommended frequency of visits: [unfilled] [Return in ____ week(s)] : Return in [unfilled] week(s) [FreeTextEntry1] : Next session to be scheduled.

## 2023-01-31 NOTE — REASON FOR VISIT
[Verbal consent obtained from patient] : the patient, [unfilled] [Patient] : the patient [Self] : self [Other:___] : due to [unfilled] [Continuing, patient not seen in-person within last 12 months (provide details below)] : Telehealth services are continuing, patient not seen in-person within last 12 months.  [Telephone (audio) - Individual/Group] : This telephonic visit was provided via audio only technology. [Medical Office: ([facility])___] : The provider was located at the medical office in [unfilled]. [Home] : The patient, [unfilled], was located at home, [unfilled], at the time of the visit. [FreeTextEntry4] : 2975 [FreeTextEntry5] : 2379 [FreeTextEntry3] : [facility] [place] difficulty travelling on his own.   [FreeTextEntry1] : continued psychotherapy\par

## 2023-01-31 NOTE — PHYSICAL EXAM
[Cooperative] : cooperative [Euthymic] : euthymic [Full] : full [Clear] : clear [Linear/Goal Directed] : linear/goal directed [None] : none [None Reported] : none reported [Average] : average [WNL] : within normal limits [FreeTextEntry1] : Unable to assess audio only session [de-identified] : unable to assess audio only session  [de-identified] : unable to assess audio only session  [FreeTextEntry8] : "I'm fine."

## 2023-02-01 ENCOUNTER — APPOINTMENT (OUTPATIENT)
Dept: PSYCHIATRY | Facility: CLINIC | Age: 23
End: 2023-02-01
Payer: COMMERCIAL

## 2023-02-01 ENCOUNTER — OUTPATIENT (OUTPATIENT)
Dept: OUTPATIENT SERVICES | Facility: HOSPITAL | Age: 23
LOS: 1 days | Discharge: HOME | End: 2023-02-01

## 2023-02-01 DIAGNOSIS — Z00.00 ENCOUNTER FOR GENERAL ADULT MEDICAL EXAMINATION W/OUT ABNORMAL FINDINGS: ICD-10-CM

## 2023-02-01 DIAGNOSIS — F20.9 SCHIZOPHRENIA, UNSPECIFIED: ICD-10-CM

## 2023-02-01 PROCEDURE — 99214 OFFICE O/P EST MOD 30 MIN: CPT | Mod: 95

## 2023-02-02 ENCOUNTER — APPOINTMENT (OUTPATIENT)
Dept: PSYCHIATRY | Facility: CLINIC | Age: 23
End: 2023-02-02

## 2023-02-09 ENCOUNTER — APPOINTMENT (OUTPATIENT)
Age: 23
End: 2023-02-09

## 2023-02-16 ENCOUNTER — OUTPATIENT (OUTPATIENT)
Dept: OUTPATIENT SERVICES | Facility: HOSPITAL | Age: 23
LOS: 1 days | End: 2023-02-16
Payer: COMMERCIAL

## 2023-02-16 ENCOUNTER — APPOINTMENT (OUTPATIENT)
Dept: PSYCHIATRY | Facility: CLINIC | Age: 23
End: 2023-02-16

## 2023-02-16 DIAGNOSIS — F20.9 SCHIZOPHRENIA, UNSPECIFIED: ICD-10-CM

## 2023-02-16 PROCEDURE — 90834 PSYTX W PT 45 MINUTES: CPT | Mod: 95

## 2023-02-20 NOTE — PLAN
[Motivational Interviewing] : Motivational Interviewing  [FreeTextEntry2] : Shakila is struggling with motivation to accomplish his goals for work, school and health.   [Psychoeducation] : Psychoeducation  [de-identified] : Met with Shakila via [employer] for session.  The session focused on Shakila's reported lack of motivation to do anything.  Shakila has recognized that he has been isolating more and sleeping different hours, not showering daily and not developing any plans with his friends.  Shakila has good insight and states that his cousin being diagnosed with cancer affected him.  Shakila states without having much to do he has no schedule or places he needs to be.  He reports he has kept his room clean and did his laundry.  PC asked Shakila if he feels there is a connection between mom not being home and him retreating.  Shakila acknowledges that he is sad and lonely when his mom is away.  He reports looking forward to going on a cruise with his mom, step dad and extended family.  Shakila states he needs to return to a routine that includes diet and exercise as he will need to put on a swim suit.  Shakila and PC spoke about behavioral activation.  PC educated Shakila and PC spoke about how he may be able to remind himself of the importance of "just doing it."  Loboanatoliy was open to this but feels he may be unable to motivate himself to do it.  PC and Shakila discussed some strategies that might be helpful. Shakila was receptive.   [Recommended Frequency of Visits: ____] : Recommended frequency of visits: [unfilled] [Return in ____ week(s)] : Return in [unfilled] week(s) [FreeTextEntry1] : Next session is 02/23/2023 at 1700.

## 2023-02-20 NOTE — RISK ASSESSMENT
[No, patient denies ideation or behavior] : No, patient denies ideation or behavior [No] : No [Not clinically indicated] : Safety Plan completed/updated (for individuals at risk): Not clinically indicated

## 2023-02-20 NOTE — END OF VISIT
[Duration of Psychotherapy Visit (minutes spent in synchronous communication): ____] : Duration of Psychotherapy Visit (minutes spent in synchronous communication): [unfilled] [Individual Psychotherapy for 38-52 minutes] : Individual Psychotherapy for 38 - 52 minutes [Teletherapy Service Provided] : The services provided in this session were delivered via tele-therapy [FreeTextEntry3] : home [FreeTextEntry5] : [address] [place] [address]

## 2023-02-20 NOTE — REASON FOR VISIT
[Self] : self [Patient preference] : as per patient preference [Continuing, patient not seen in-person within last 12 months (provide details below)] : Telehealth services are continuing, patient not seen in-person within last 12 months.  [Telehealth (audio & video) - Individual/Group] : This visit was provided via telehealth using real-time 2-way audio visual technology. [Medical Office: ([facility])___] : The provider was located at the medical office in [unfilled]. [Home] : The patient, [unfilled], was located at home, [unfilled], at the time of the visit. [Patient] : Patient [FreeTextEntry4] : 0527 [FreeTextEntry5] : 0729 [FreeTextEntry1] : continued psychotherapy\par

## 2023-02-20 NOTE — PHYSICAL EXAM
[Well groomed] : well groomed [Cooperative] : cooperative [Depressed] : depressed [Flat] : flat [Clear] : clear [Linear/Goal Directed] : linear/goal directed [None] : none [None Reported] : none reported [Average] : average [WNL] : within normal limits

## 2023-02-23 ENCOUNTER — APPOINTMENT (OUTPATIENT)
Dept: PSYCHIATRY | Facility: CLINIC | Age: 23
End: 2023-02-23

## 2023-02-23 ENCOUNTER — OUTPATIENT (OUTPATIENT)
Dept: OUTPATIENT SERVICES | Facility: HOSPITAL | Age: 23
LOS: 1 days | End: 2023-02-23
Payer: COMMERCIAL

## 2023-02-23 DIAGNOSIS — F20.9 SCHIZOPHRENIA, UNSPECIFIED: ICD-10-CM

## 2023-02-23 PROCEDURE — 90832 PSYTX W PT 30 MINUTES: CPT | Mod: 95

## 2023-02-24 ENCOUNTER — APPOINTMENT (OUTPATIENT)
Dept: PSYCHIATRY | Facility: CLINIC | Age: 23
End: 2023-02-24
Payer: COMMERCIAL

## 2023-02-24 ENCOUNTER — OUTPATIENT (OUTPATIENT)
Dept: OUTPATIENT SERVICES | Facility: HOSPITAL | Age: 23
LOS: 1 days | End: 2023-02-24
Payer: COMMERCIAL

## 2023-02-24 DIAGNOSIS — F20.9 SCHIZOPHRENIA, UNSPECIFIED: ICD-10-CM

## 2023-02-24 PROCEDURE — 99214 OFFICE O/P EST MOD 30 MIN: CPT | Mod: 95

## 2023-02-25 NOTE — PHYSICAL EXAM
[Cooperative] : cooperative [Full] : full [Clear] : clear [Linear/Goal Directed] : linear/goal directed [None] : none [None Reported] : none reported [Average] : average [WNL] : within normal limits [FreeTextEntry8] : "I'm okay."

## 2023-02-25 NOTE — END OF VISIT
[Duration of Psychotherapy Visit (minutes spent in synchronous communication): ____] : Duration of Psychotherapy Visit (minutes spent in synchronous communication): [unfilled] [Individual Psychotherapy for 38-52 minutes] : Individual Psychotherapy for 38 - 52 minutes [Teletherapy Service Provided] : The services provided in this session were delivered via tele-therapy [FreeTextEntry3] : home [FreeTextEntry5] : [address] [place] [address]

## 2023-02-25 NOTE — PLAN
[FreeTextEntry2] : Shakila is thinking about getting a job.   [Motivational Interviewing] : Motivational Interviewing  [de-identified] : Met with Loboanatoliy via [employer] doc for session.  The session focused on goals Shakila has had that he is slowly migrating away from.  PC explored with [employer] what has been in the way of him gaining employment.  Shakila identified that when his cousin became ill he put his focus on him and not on his other goals.  he recalled quitting [employer] training and staying with his cousin.  Upon further investigation Shakila identified that he failed an important exam and walked away from the program.  We discussed other ways this could have been handled.  Loboanatoliy agreed and stated he didn't reach out for help, which he realizes would have been beneficial.  Asked Shakila if he would like to meet with Analia ([employer]) to return to job hunting.  Shakila agreed and an appointment was made.  Shakila and PC discussed other roadblocks to employment.  Shakila states he really wants to find a job he really wants like in construction or  etc.  PC encouraged him to express how he feels about gaining employment with Analia.  He agreed.   [Recommended Frequency of Visits: ____] : Recommended frequency of visits: [unfilled] [Return in ____ week(s)] : Return in [unfilled] week(s) [FreeTextEntry1] : KOKI Jhaveri to develop a list of pros and cons of working to help bolster motivation.  Next appointment is 03/02/2023 at 1300.

## 2023-02-25 NOTE — REASON FOR VISIT
[Other Location: e.g. School (Enter Location, City,State)___] : at [unfilled], at the time of the visit. [Patient preference] : as per patient preference [Continuing, patient not seen in-person within last 12 months (provide details below)] : Telehealth services are continuing, patient not seen in-person within last 12 months.  [Telehealth (audio & video) - Individual/Group] : This visit was provided via telehealth using real-time 2-way audio visual technology. [Medical Office: ([facility])___] : The provider was located at the medical office in [unfilled]. [Home] : The patient, [unfilled], was located at home, [unfilled], at the time of the visit. [FreeTextEntry4] : 7398 [FreeTextEntry5] : 2682 [Patient] : Patient [FreeTextEntry1] : continued psychotherapy\par

## 2023-02-28 ENCOUNTER — APPOINTMENT (OUTPATIENT)
Dept: PSYCHIATRY | Facility: CLINIC | Age: 23
End: 2023-02-28

## 2023-02-28 DIAGNOSIS — F20.9 SCHIZOPHRENIA, UNSPECIFIED: ICD-10-CM

## 2023-03-02 ENCOUNTER — NON-APPOINTMENT (OUTPATIENT)
Age: 23
End: 2023-03-02

## 2023-03-02 ENCOUNTER — APPOINTMENT (OUTPATIENT)
Dept: PSYCHIATRY | Facility: CLINIC | Age: 23
End: 2023-03-02

## 2023-03-08 ENCOUNTER — APPOINTMENT (OUTPATIENT)
Dept: PSYCHIATRY | Facility: CLINIC | Age: 23
End: 2023-03-08

## 2023-03-10 ENCOUNTER — OUTPATIENT (OUTPATIENT)
Dept: OUTPATIENT SERVICES | Facility: HOSPITAL | Age: 23
LOS: 1 days | End: 2023-03-10
Payer: COMMERCIAL

## 2023-03-10 ENCOUNTER — APPOINTMENT (OUTPATIENT)
Dept: PSYCHIATRY | Facility: CLINIC | Age: 23
End: 2023-03-10

## 2023-03-10 ENCOUNTER — OUTPATIENT (OUTPATIENT)
Dept: OUTPATIENT SERVICES | Facility: HOSPITAL | Age: 23
LOS: 1 days | End: 2023-03-10

## 2023-03-10 DIAGNOSIS — F20.9 SCHIZOPHRENIA, UNSPECIFIED: ICD-10-CM

## 2023-03-10 PROCEDURE — 90834 PSYTX W PT 45 MINUTES: CPT | Mod: 95

## 2023-03-11 DIAGNOSIS — F20.9 SCHIZOPHRENIA, UNSPECIFIED: ICD-10-CM

## 2023-03-13 ENCOUNTER — APPOINTMENT (OUTPATIENT)
Dept: PSYCHIATRY | Facility: CLINIC | Age: 23
End: 2023-03-13

## 2023-03-13 ENCOUNTER — OUTPATIENT (OUTPATIENT)
Dept: OUTPATIENT SERVICES | Facility: HOSPITAL | Age: 23
LOS: 1 days | End: 2023-03-13

## 2023-03-13 DIAGNOSIS — F20.9 SCHIZOPHRENIA, UNSPECIFIED: ICD-10-CM

## 2023-03-13 NOTE — PLAN
[FreeTextEntry2] : Shakila is working toward developing healthy relationships.   [Interpersonal Therapy] : Interpersonal Therapy  [Motivational Interviewing] : Motivational Interviewing  [de-identified] : Met with Shakila via [employer] doc for session,  The focus of the session was on his past relationships and his current relationship with his mother.  Explored his past intimate relationships with Shakila.  He identified that he has had several intimate relationships with females since the age of 17,  He states that he was likely not mature to be in these types of relationships, however, he felt at the time it was okay.  He recalled that there were a lot of damaging exchanges in these relationships.  He states girl were not faithful and spread rumors about him.  The most damaging thing that Shakila spoke about was his girlfriend having an  without telling him.  Shakila states this has influenced the way he looks at relationships.  Shakila states he has one person that has been consistent in his life, his mother.  He states he often thinks about the things he has done as a child which were aligned with the values his mother taught him.  Shakila says, "I feel so guilty."  Shakila states he would like to make a mends.  However, he states he becomes ambivalent and over thinks it.  We will continue to discuss the Pros and Cons of the mends.   [Recommended Frequency of Visits: ____] : Recommended frequency of visits: [unfilled] [Return in ____ week(s)] : Return in [unfilled] week(s) [FreeTextEntry1] : Next session is 03/16/2023 at 1400.  Complete a treatment plan review and revision.

## 2023-03-13 NOTE — PLAN
[Skills training (all types)] : Skills training (all types)  [Other: ____] : [unfilled] [Recommended Frequency of Visits: ____] : Recommended frequency of visits: [unfilled] [Return in ____ week(s)] : Return in [unfilled] week(s) [FreeTextEntry2] : To obtain employment and reach educational goals.\par \par  [de-identified] : Shakila continues to engage weekly with the SEES while working on his goal which is to explore employment options to obtain a job and to look into educational options as well. Shakila met with the SEES virtually, was punctual and ready to keep an open mind with exploring around the virtual job fair with the SEES. This was intended to outline which employers was hiring and who would be present for the virtual meet and greets for tomorrow which would be his opportunity to get in front of employers. Shakila showed some interest in several opportunities however, expressed his concern about going away on vacation and possibly going to [place] to stay with his cousin for a while and possibly wanting to look into work there. SEES suggested that he utilize this opportunity to enhance his communication skills and interviewing skills by engaging in employers. Shakila agreed that this would be a good opportunity and would be joining in the job fair tomorrow.  [FreeTextEntry1] : Shakila will meet with the SEES again on 3/14.

## 2023-03-13 NOTE — REASON FOR VISIT
[Patient preference] : as per patient preference [Continuing, patient seen in-person within last 12 months] : Telehealth services are continuing as patient has been seen in-person within last 12 months. [Medical Office: ([facility])___] : The provider was located at the medical office in [unfilled]. [Home] : The patient, [unfilled], was located at home, [unfilled], at the time of the visit. [Verbal consent obtained from patient/other participant(s)] : Verbal consent for telehealth/telephonic services obtained from patient/other participant(s) [Patient] : Patient [FreeTextEntry4] : 11:00am [FreeTextEntry5] : 11:45am [FreeTextEntry1] : Supportive employment and educational services.\par

## 2023-03-13 NOTE — REASON FOR VISIT
[Self] : self [Patient preference] : as per patient preference [Continuing, patient seen in-person within last 12 months] : Telehealth services are continuing as patient has been seen in-person within last 12 months. [Telehealth (audio & video) - Individual/Group] : This visit was provided via telehealth using real-time 2-way audio visual technology. [Medical Office: ([facility])___] : The provider was located at the medical office in [unfilled]. [Home] : The patient, [unfilled], was located at home, [unfilled], at the time of the visit. [FreeTextEntry4] : 9684 [FreeTextEntry5] : 1003 [Patient] : Patient

## 2023-03-13 NOTE — END OF VISIT
[Individual Psychotherapy for 38-52 minutes] : Individual Psychotherapy for 38 - 52 minutes [Teletherapy Service Provided] : The services provided in this session were delivered via tele-therapy [FreeTextEntry3] : Home [FreeTextEntry5] : [address] [place] [address]

## 2023-03-13 NOTE — END OF VISIT
[Duration of Psychotherapy Visit (minutes spent in synchronous communication): ____] : Duration of Psychotherapy Visit (minutes spent in synchronous communication): [unfilled] [Individual Psychotherapy for 38-52 minutes] : Individual Psychotherapy for 38 - 52 minutes [FreeTextEntry3] : home [Teletherapy Service Provided] : The services provided in this session were delivered via tele-therapy [FreeTextEntry5] : [address] [place] [address]

## 2023-03-14 ENCOUNTER — OUTPATIENT (OUTPATIENT)
Dept: OUTPATIENT SERVICES | Facility: HOSPITAL | Age: 23
LOS: 1 days | End: 2023-03-14

## 2023-03-14 ENCOUNTER — APPOINTMENT (OUTPATIENT)
Dept: PSYCHIATRY | Facility: CLINIC | Age: 23
End: 2023-03-14

## 2023-03-14 DIAGNOSIS — F20.9 SCHIZOPHRENIA, UNSPECIFIED: ICD-10-CM

## 2023-03-15 DIAGNOSIS — F20.9 SCHIZOPHRENIA, UNSPECIFIED: ICD-10-CM

## 2023-03-15 NOTE — END OF VISIT
[Individual Psychotherapy for 38-52 minutes] : Individual Psychotherapy for 38 - 52 minutes [Teletherapy Service Provided] : The services provided in this session were delivered via tele-therapy [FreeTextEntry3] : Home [FreeTextEntry5] : [address] [place] [address]

## 2023-03-15 NOTE — PLAN
[Skills training (all types)] : Skills training (all types)  [Other: ____] : [unfilled] [FreeTextEntry2] : Shakila will create meaningful academic and occupational engagements.  [de-identified] : Shakila continues to engage with the SEES while working on his goal which is to create meaningful academic and occupational engagements. SEES checked in with Shakila around what he has been doing since last meeting with the SEES. Shakila reported that he has been engaging in searching for work and has applied to a few places. Shakila reported that he has an interview in place at [facility] but was unsure whether he will be attending. SEES attempted to understand his thoughts around why he was resistant about going for this interview. SEES mentioned a job fair that was taking place next week. Sahkila was open to attending the job fair and wanted to know which jobs would be there. SEES offered to explore the fair before it begins on Monday to check out the employers that would be there, Shakila agreed to meet with the SEES on 3/13 to review employers. Shakila mentioned that he will be possibly going to stay in [place] long term and was on the fence about finding a job.  [Recommended Frequency of Visits: ____] : Recommended frequency of visits: [unfilled] [Return in ____ week(s)] : Return in [unfilled] week(s) [FreeTextEntry1] : Shaklia will meet with the SEES again on 3/13.

## 2023-03-15 NOTE — REASON FOR VISIT
[Patient preference] : as per patient preference [Continuing, patient not seen in-person within last 12 months (provide details below)] : Telehealth services are continuing, patient not seen in-person within last 12 months.  [Telehealth (audio & video) - Individual/Group] : This visit was provided via telehealth using real-time 2-way audio visual technology. [Medical Office: ([facility])___] : The provider was located at the medical office in [unfilled]. [Home] : The patient, [unfilled], was located at home, [unfilled], at the time of the visit. [Verbal consent obtained from patient/other participant(s)] : Verbal consent for telehealth/telephonic services obtained from patient/other participant(s) [Patient] : Patient [FreeTextEntry4] : 3:30pm [FreeTextEntry5] : 4:15pm [FreeTextEntry1] : Supportive employment and educational services.\par

## 2023-03-16 ENCOUNTER — APPOINTMENT (OUTPATIENT)
Dept: PSYCHIATRY | Facility: CLINIC | Age: 23
End: 2023-03-16

## 2023-03-16 ENCOUNTER — OUTPATIENT (OUTPATIENT)
Dept: OUTPATIENT SERVICES | Facility: HOSPITAL | Age: 23
LOS: 1 days | End: 2023-03-16
Payer: COMMERCIAL

## 2023-03-16 DIAGNOSIS — F20.9 SCHIZOPHRENIA, UNSPECIFIED: ICD-10-CM

## 2023-03-16 PROCEDURE — 90834 PSYTX W PT 45 MINUTES: CPT | Mod: 95

## 2023-03-17 NOTE — END OF VISIT
[Duration of Psychotherapy Visit (minutes spent in synchronous communication): ____] : Duration of Psychotherapy Visit (minutes spent in synchronous communication): [unfilled] [Individual Psychotherapy for 38-52 minutes] : Individual Psychotherapy for 38 - 52 minutes [Teletherapy Service Provided] : The services provided in this session were delivered via tele-therapy [FreeTextEntry3] : home  [FreeTextEntry5] : [address] [place] [address]

## 2023-03-17 NOTE — REASON FOR VISIT
[Patient preference] : as per patient preference [Continuing, patient seen in-person within last 12 months] : Telehealth services are continuing as patient has been seen in-person within last 12 months. [Telehealth (audio & video) - Individual/Group] : This visit was provided via telehealth using real-time 2-way audio visual technology. [Medical Office: ([facility])___] : The provider was located at the medical office in [unfilled]. [Home] : The patient, [unfilled], was located at home, [unfilled], at the time of the visit. [[facility] : 8338 [FreeTextEntry5] : 2654 [Patient] : Patient [FreeTextEntry1] : continued psychotherapy\par

## 2023-03-17 NOTE — PLAN
[FreeTextEntry2] : Treatment plan review  [Other: ____] : [unfilled] [de-identified] : Met with Shakila via [employer] doc for session.  The session focused on revising/reviewing his treatment plan.  Loboanatoliy states he would like to focus the treatment plan on occupational/educational goals  He states he would like to be employed full time within the year.  He identifies that he feels better when he is working.  He noted a feeling of self worth and confidence.  Loboanatoliy states that he needs to improve his diet and incorporate exercise.  He reports gaining a lot of weight because of the medications, sedentary lifestyle and poor diet.  Lastly, he wants to develop a baum lifestyle.  He would like to develop some intimate relationships and learn some boundaries that are healthy.  Shakila and PC discussed his mental health.  Shakila states overall he feels good but notices some issues with his mood.  This will be worked on with PC and psychiatry if needed.  Lastly, Shakila will be on vacation for 10 days in April and states he is looking forward to it.  \par  [Recommended Frequency of Visits: ____] : Recommended frequency of visits: [unfilled] [Return in ____ week(s)] : Return in [unfilled] week(s) [FreeTextEntry1] : Next session is 03/23/2023 at 1400.

## 2023-03-17 NOTE — PHYSICAL EXAM
[Full] : full [Cooperative] : cooperative [Clear] : clear [Linear/Goal Directed] : linear/goal directed [None] : none [None Reported] : none reported [Average] : average [WNL] : within normal limits [FreeTextEntry8] : "I'm okay."

## 2023-03-20 ENCOUNTER — APPOINTMENT (OUTPATIENT)
Dept: PSYCHIATRY | Facility: CLINIC | Age: 23
End: 2023-03-20

## 2023-03-20 ENCOUNTER — OUTPATIENT (OUTPATIENT)
Dept: OUTPATIENT SERVICES | Facility: HOSPITAL | Age: 23
LOS: 1 days | End: 2023-03-20

## 2023-03-20 DIAGNOSIS — F20.9 SCHIZOPHRENIA, UNSPECIFIED: ICD-10-CM

## 2023-03-21 DIAGNOSIS — F20.9 SCHIZOPHRENIA, UNSPECIFIED: ICD-10-CM

## 2023-03-23 ENCOUNTER — APPOINTMENT (OUTPATIENT)
Dept: PSYCHIATRY | Facility: CLINIC | Age: 23
End: 2023-03-23

## 2023-03-26 PROBLEM — Z00.00 ENCOUNTER FOR PREVENTIVE HEALTH EXAMINATION: Status: ACTIVE | Noted: 2021-03-26

## 2023-03-26 NOTE — HISTORY OF PRESENT ILLNESS
[FreeTextEntry1] : Shakila was seen for psychiatric follow-up.  Upon interview he reported his mood has been "okay."  He stated he is "not as motivated as I should be".  He denied feeling sad or depressed.  He reported sleeping from 2 AM to 9 AM and experiencing some nightmares.  He reported goals for his future including going back to work once the season returns.  He reported having been able to lose about 12 pounds since starting the metformin at last encounter.  He denied any thoughts of wanting to harm himself or anyone else [FreeTextEntry2] : Pt reports he was [place] for having thoughts of not wanting to be alive. See previous history/records.\par \par recently admitted at EOB at [facility] : 7/19/22-7/21/22 after he presented himself to the ED for bruising. While he was in the EOB he was started on Depakene 1500mg at bedtime and Olanzapine was increased to 20mg at bedtime. \par

## 2023-03-26 NOTE — REASON FOR VISIT
[Patient preference] : as per patient preference [Telehealth (audio & video) - Individual/Group] : This visit was provided via telehealth using real-time 2-way audio visual technology. [Medical Office: ([facility])___] : The provider was located at the medical office in [unfilled]. [Home] : The patient, [unfilled], was located at home, [unfilled], at the time of the visit. [Verbal consent obtained from patient/other participant(s)] : Verbal consent for telehealth/telephonic services obtained from patient/other participant(s) [Patient] : Patient [Mother] : mother [FreeTextEntry4] : 11am [FreeTextEntry5] : 11:30am [FreeTextEntry1] : "I'm OK"

## 2023-03-26 NOTE — SOCIAL HISTORY
[FreeTextEntry1] : Pt reports he was previously hospitalized in [place] for having thoughts of not wanting to be alive. See previous history/records.\par \par recently admitted at EOB at [facility] : 7/19/22-7/21/22 after he presented himself to the ED for bruising. While he was in the EOB he was started on Depakene 1500mg at bedtime and Olanzapine was increased to 20mg at bedtime. \par \par \par history of cannabis use disorder\par current use of nicotine vaporizer, reports cutting back\par Drinks alcohol socially and not more than two drinks at a time.

## 2023-03-26 NOTE — DISCUSSION/SUMMARY
[FreeTextEntry1] : Shakila Pacheco is 21 years old, , English speaking, single , currently employed, domiciled with his mother, with a history of having been diagnosed with schizophrenia. He was referred to [facility] after his first IPP in [facility] for severe mood episode with psychotic features with resolved with addition of Olanzapine . Per the intake record, there might be another depressive episode that patient is experienced few months prior to his admission to [facility] ( reported by mother- depressed, withdwarn, excessive sleep and low appetite) Patient was diagnosed with Schizophreniform disorder and now given the duration of symptoms meets criteria for schizophrenia , however, considering his family history and his clinical presentation, Bipolar 1 disorder cannot be excluded at this time. Patient demonstrated relatively good control of active psychotic and mood symptoms on olanzapine 20 mg but complained of feeling very tired during the day and expressed concerns over increased appetite, tolerating lower Olanzapine 15 mg qhs without increase in mood or psychotic symptoms. He expressed a desire to decrease the Olanzapine to 10mg today and reported having discussed this with his mother. \par \par On evaluation today, pt is requesting to stop Depakene due to excess sleeping and daytime sedation since last encounter. Today, through shared decision-making, we agreed to stop Depakote due to weight gain and day-time sedation. Will also lower Olanzapine to address the daytime sedation. May consider starting SSRI if patients low energy continues and if his mood worsens. Today he is denying low mood associated with his vegetative symptoms. He denied thoughts of suicide or not wanting to be alive. He denied symptoms of psychosis. He reported abstaining from cannabis use. \par

## 2023-03-26 NOTE — REASON FOR VISIT
[Patient preference] : as per patient preference [Telehealth (audio & video) - Individual/Group] : This visit was provided via telehealth using real-time 2-way audio visual technology. [Medical Office: ([facility])___] : The provider was located at the medical office in [unfilled]. [Home] : The patient, [unfilled], was located at home, [unfilled], at the time of the visit. [Verbal consent obtained from patient/other participant(s)] : Verbal consent for telehealth/telephonic services obtained from patient/other participant(s) [FreeTextEntry4] : 11am [FreeTextEntry5] : 11:30am [Patient] : Patient [Mother] : mother [FreeTextEntry1] : "I'm OK"

## 2023-03-26 NOTE — HISTORY OF PRESENT ILLNESS
[FreeTextEntry1] : Shakila was seen for psychiatric follow-up.  Upon interview he reported his mood has been "good."  He reported he has been a little bit sick recently and has been taking Sudafed and Tylenol.  He denied any symptoms of depression.  He denied any thoughts of wanting to harm himself or others.  He denied thoughts of suicide.  He reported having gone to the gym since our last encounter and states this has started helping him feel better.  He reports helping a neighbor recently which also made him feel good.  He denied symptoms of psychosis such as auditory or visual hallucinations.  He denied feeling paranoid or suspicious.  He reports taking the metformin without noticeable side effects. [FreeTextEntry2] : Pt [place] having thoughts of not wanting to be alive. See previous history/records.\par \par recently admitted at EOB at [facility] : 7/19/22-7/21/22 after he presented himself to the ED for bruising. While he was in the EOB he was started on Depakene 1500mg at bedtime and Olanzapine was increased to 20mg at bedtime. \par

## 2023-03-26 NOTE — PLAN
[Medication education provided] : Medication education provided. [Rationale for medication choices, possible risks/precautions, benefits, alternative treatment choices, and consequences of non-treatment discussed] : Rationale for medication choices, possible risks/precautions, benefits, alternative treatment choices, and consequences of non-treatment discussed with patient/family/caregiver  [FreeTextEntry4] : Goal 1: Prevent reoccurrence of psychosis and maintain health. \par Goal keyword or goal statement: identify antecedents to psychosis Assessment of progress on goal/objective: Shakila has identified that stress of dating has been a trigger to feelings of depression and isolation. He is currently involved in a relationship and he states this has been "good for me." However, there are other girls that are interested in him and this makes balancing his work and personal life difficult and stressful. 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 Jesenia, Shakila's mother is no longer able to attend the family support meetings because she has returned to working in her office full-time. Jesenia remains involved and reaches out to the team when needed. Shakila states he is struggling with incorporating physical exercise into his life. Shakila is no longer considering a gym membership as he has a physically demanding job. ANTIONETTE and Shakila discussed maintaining contact with the team to add to consistency of his treatment.\par  [FreeTextEntry5] : #).  Increase metformin 500mg XR to metformin 1000 mg XR daily\par #). Continue Olanzapine 15mg daily to address low energy and feeling sedated during the day\par #). MI used to foster motivation for recovery\par #). Continue with [facility] therapist and SEES

## 2023-03-26 NOTE — DISCUSSION/SUMMARY
[FreeTextEntry1] : Shakila Pacheco is 21 years old, , English speaking, single , currently employed, domiciled with his mother, with a history of having been diagnosed with schizophrenia. He was referred to [facility] after his first IPP in [facility] for severe mood episode with psychotic features with resolved with addition of Olanzapine . Per the intake record, there might be another depressive episode that patient is experienced few months prior to his admission to [facility] ( reported by mother- depressed, withdwarn, excessive sleep and low appetite) Patient was diagnosed with Schizophreniform disorder and now given the duration of symptoms meets criteria for schizophrenia , however, considering his family history and his clinical presentation, Bipolar 1 disorder cannot be excluded at this time. Patient demonstrated relatively good control of active psychotic and mood symptoms on olanzapine 20 mg but complained of feeling very tired during the day and expressed concerns over increased appetite, tolerating lower Olanzapine 15 mg qhs without increase in mood or psychotic symptoms. He expressed a desire to decrease the Olanzapine to 10mg today and reported having discussed this with his mother. \par \par On evaluation today, pt is requesting to stop Depakene due to excess sleeping and daytime sedation since last encounter. Today, through shared decision-making, we agreed to stop Depakote due to weight gain and day-time sedation. Will also lower Olanzapine to address the daytime sedation. May consider starting SSRI if patients low energy continues and if his mood worsens. Today he is denying low mood associated with his vegetative symptoms. He denied thoughts of suicide or not wanting to be alive. He denied symptoms of psychosis. He reported abstaining from cannabis use. \par

## 2023-03-26 NOTE — PLAN
[Medication education provided] : Medication education provided. [Rationale for medication choices, possible risks/precautions, benefits, alternative treatment choices, and consequences of non-treatment discussed] : Rationale for medication choices, possible risks/precautions, benefits, alternative treatment choices, and consequences of non-treatment discussed with patient/family/caregiver  [FreeTextEntry4] : Goal 1: Prevent reoccurrence of psychosis and maintain health. \par Goal keyword or goal statement: identify antecedents to psychosis Assessment of progress on goal/objective: Shakila has identified that stress of dating has been a trigger to feelings of depression and isolation. He is currently involved in a relationship and he states this has been "good for me." However, there are other girls that are interested in him and this makes balancing his work and personal life difficult and stressful. 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 Jesenia, Shakila's mother is no longer able to attend the family support meetings because she has returned to working in her office full-time. Jesenia remains involved and reaches out to the team when needed. Shakila states he is struggling with incorporating physical exercise into his life. Shakila is no longer considering a gym membership as he has a physically demanding job. ANTIONETTE and Shakila discussed maintaining contact with the team to add to consistency of his treatment.\par  [FreeTextEntry5] : #). continue Metformin 100mg XR\par #). Continue Olanzapine 15mg daily to address low energy and feeling sedated during the day\par #). MI used to foster motivation for recovery\par #). Continue with [facility] therapist and SEES

## 2023-03-26 NOTE — SOCIAL HISTORY
[FreeTextEntry1] : Pt [place] having thoughts of not wanting to be alive. See previous history/records.\par \par recently admitted at EOB at [facility] : 7/19/22-7/21/22 after he presented himself to the ED for bruising. While he was in the EOB he was started on Depakene 1500mg at bedtime and Olanzapine was increased to 20mg at bedtime. \par \par \par history of cannabis use disorder\par current use of nicotine vaporizer, reports cutting back\par Drinks alcohol socially and not more than two drinks at a time.

## 2023-03-27 NOTE — REASON FOR VISIT
[Patient preference] : as per patient preference [Continuing, patient not seen in-person within last 12 months (provide details below)] : Telehealth services are continuing, patient not seen in-person within last 12 months.  [Telehealth (audio & video) - Individual/Group] : This visit was provided via telehealth using real-time 2-way audio visual technology. [Other Location: e.g. Home (Enter Location, City,State)___] : The provider was located at [unfilled]. [Home] : The patient, [unfilled], was located at home, [unfilled], at the time of the visit. [Verbal consent obtained from patient/other participant(s)] : Verbal consent for telehealth/telephonic services obtained from patient/other participant(s) [FreeTextEntry4] : 3:00pm [FreeTextEntry5] : 3:45pm [Patient] : Patient [FreeTextEntry1] : Supportive employment and educational services.\par

## 2023-03-27 NOTE — END OF VISIT
[Individual Psychotherapy for 38-52 minutes] : Individual Psychotherapy for 38 - 52 minutes [Teletherapy Service Provided] : The services provided in this session were delivered via tele-therapy [FreeTextEntry3] : Home [FreeTextEntry5] : Home-Remote Work

## 2023-03-27 NOTE — PLAN
[FreeTextEntry2] :  Shakila will create meaningful academic and occupational engagements.  [Skills training (all types)] : Skills training (all types)  [Other: ____] : [unfilled] [de-identified] : Loboanatoliy continues to engage weekly with the SEES while working on his goal which is to create meaningful academic and occupational engagements. Shakila continues to work with the SEES on exploring job options. SEES checked in with Shakila around whether he was able to make the job fair that SEES suggested that he attend. Loboanatoliy reported that he was able to pop in and explore some options virtually. Shakila did reach out to the SEES earlier for assistance with navigating how to speak to employers. Shakila reported that he was interested in some of the employers and would be following up with them. Shakila continues to work with the SEES on what opportunity would be a good match for him.  [Recommended Frequency of Visits: ____] : Recommended frequency of visits: [unfilled] [Return in ____ week(s)] : Return in [unfilled] week(s) [FreeTextEntry1] : Shakila will meet with the SEES again on 3/20.

## 2023-03-29 ENCOUNTER — APPOINTMENT (OUTPATIENT)
Dept: PSYCHIATRY | Facility: CLINIC | Age: 23
End: 2023-03-29

## 2023-03-30 ENCOUNTER — NON-APPOINTMENT (OUTPATIENT)
Age: 23
End: 2023-03-30

## 2023-03-30 ENCOUNTER — APPOINTMENT (OUTPATIENT)
Dept: PSYCHIATRY | Facility: CLINIC | Age: 23
End: 2023-03-30

## 2023-04-04 ENCOUNTER — OUTPATIENT (OUTPATIENT)
Dept: OUTPATIENT SERVICES | Facility: HOSPITAL | Age: 23
LOS: 1 days | End: 2023-04-04
Payer: COMMERCIAL

## 2023-04-04 ENCOUNTER — APPOINTMENT (OUTPATIENT)
Dept: PSYCHIATRY | Facility: CLINIC | Age: 23
End: 2023-04-04
Payer: COMMERCIAL

## 2023-04-04 DIAGNOSIS — F20.9 SCHIZOPHRENIA, UNSPECIFIED: ICD-10-CM

## 2023-04-04 PROCEDURE — 99214 OFFICE O/P EST MOD 30 MIN: CPT | Mod: 95

## 2023-04-04 RX ORDER — VALPROIC ACID 250 MG/5ML
250 SOLUTION ORAL
Qty: 450 | Refills: 0 | Status: DISCONTINUED | COMMUNITY
Start: 2022-08-11 | End: 2023-04-04

## 2023-04-05 DIAGNOSIS — F20.9 SCHIZOPHRENIA, UNSPECIFIED: ICD-10-CM

## 2023-04-10 NOTE — DISCUSSION/SUMMARY
[FreeTextEntry1] : Shakila Pacheco is 21 years old, , English speaking, single , currently employed, domiciled with his mother, with a history of having been diagnosed with schizophrenia. He was referred to [facility] program after his first IPP in [facility] for severe mood episode with psychotic features with resolved with addition of Olanzapine . Per the intake record, there might be another depressive episode that patient is experienced few months prior to his admission to [facility] ( reported by mother- depressed, withdwarn, excessive sleep and low appetite) Patient was diagnosed with Schizophreniform disorder and now given the duration of symptoms meets criteria for schizophrenia , however, considering his family history and his clinical presentation, Bipolar 1 disorder cannot be excluded at this time. Patient demonstrated relatively good control of active psychotic and mood symptoms on olanzapine 20 mg but complained of feeling very tired during the day and expressed concerns over increased appetite, tolerating lower Olanzapine 15 mg qhs without increase in mood or psychotic symptoms. He expressed a desire to decrease the Olanzapine to 10mg today and reported having discussed this with his mother. \par \par On evaluation today, Shakila is reporting that he feels his mood is low and he has low motivation to engage in activities he used to enjoy through shared decision making we made a plan to start an SSRI trial to see if his mood improves.  Previously he had been on Prozac but he was open to starting something different today.  He denied thoughts of suicide or not wanting to be alive. He denied symptoms of psychosis. He reported abstaining from cannabis use. \par

## 2023-04-10 NOTE — SOCIAL HISTORY
[FreeTextEntry1] : Pt [place] having thoughts of not wanting to be alive. See previous history/records.\par \par recently admitted at EOB at [facility] : 7/19/22-7/21/22 after he presented himself to the ED for bruising. While he was in the EOB he was started on Depakene 1500mg at bedtime and Olanzapine was increased to 20mg at bedtime. \par \par \par history of cannabis use disorder\par current use of nicotine vaporizer, reports cutting back\par Drinks alcohol socially and not more than two drinks at a time.

## 2023-04-10 NOTE — PLAN
[Medication education provided] : Medication education provided. [Rationale for medication choices, possible risks/precautions, benefits, alternative treatment choices, and consequences of non-treatment discussed] : Rationale for medication choices, possible risks/precautions, benefits, alternative treatment choices, and consequences of non-treatment discussed with patient/family/caregiver  [FreeTextEntry4] : Goal 1: Prevent reoccurrence of psychosis and maintain health. \par Goal keyword or goal statement: identify antecedents to psychosis Assessment of progress on goal/objective: Shakila has identified that stress of dating has been a trigger to feelings of depression and isolation. He is currently involved in a relationship and he states this has been "good for me." However, there are other girls that are interested in him and this makes balancing his work and personal life difficult and stressful. 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 Jesenia, Shakila's mother is no longer able to attend the family support meetings because she has returned to working in her office full-time. Jesenia remains involved and reaches out to the team when needed. Shakila states he is struggling with incorporating physical exercise into his life. Shakila is no longer considering a gym membership as he has a physically demanding job. ANTIONETTE and Shakila discussed maintaining contact with the team to add to consistency of his treatment.\par  [FreeTextEntry5] : #). continue Metformin 100mg XR\par #). Continue Olanzapine 15mg daily to address low energy and feeling sedated during the day\par #). Start Lexapro 5mg for depressive symptoms\par #). MI used to foster motivation for recovery\par #). Continue with [facility] therapist and SEES

## 2023-04-10 NOTE — PHYSICAL EXAM
[Cooperative] : cooperative [Full] : full [Clear] : clear [Linear/Goal Directed] : linear/goal directed [Average] : average [WNL] : within normal limits [Depressed] : depressed [FreeTextEntry8] : "A little down."

## 2023-04-10 NOTE — REASON FOR VISIT
[Patient preference] : as per patient preference [Telehealth (audio & video) - Individual/Group] : This visit was provided via telehealth using real-time 2-way audio visual technology. [Medical Office: ([facility])___] : The provider was located at the medical office in [unfilled]. [Home] : The patient, [unfilled], was located at home, [unfilled], at the time of the visit. [Verbal consent obtained from patient/other participant(s)] : Verbal consent for telehealth/telephonic services obtained from patient/other participant(s) [Patient] : Patient [Mother] : mother [FreeTextEntry4] : 3:31pm [FreeTextEntry5] : 4pm [FreeTextEntry1] : "I'm harman OK, just need more motivation"

## 2023-04-10 NOTE — HISTORY OF PRESENT ILLNESS
[FreeTextEntry1] : Shakila was seen for psychiatric follow-up.  Upon interview he reported he has been feeling a little bit down and specifically mentioned wishing he had the desire to go to the gym, play chess, watch shows, or do other activities he used to enjoy.  He reported feeling nervous to be around other people. He denied feeling hopeless.  He reported his sleep schedule has been okay.  He denied experiencing auditory or visual hallucinations.  He stated he is "not over thinking" too much.  He denied any safety concerns such as thoughts of wanting to harm himself or others.  He reported tolerating the metformin well and denied noticing side effects from any of his medications [FreeTextEntry2] : Pt [place] having thoughts of not wanting to be alive. See previous history/records.\par \par recently admitted at EOB at [facility] : 7/19/22-7/21/22 after he presented himself to the ED for bruising. While he was in the EOB he was started on Depakene 1500mg at bedtime and Olanzapine was increased to 20mg at bedtime. \par

## 2023-04-18 ENCOUNTER — APPOINTMENT (OUTPATIENT)
Dept: PSYCHIATRY | Facility: CLINIC | Age: 23
End: 2023-04-18
Payer: COMMERCIAL

## 2023-04-18 ENCOUNTER — OUTPATIENT (OUTPATIENT)
Dept: OUTPATIENT SERVICES | Facility: HOSPITAL | Age: 23
LOS: 1 days | End: 2023-04-18
Payer: COMMERCIAL

## 2023-04-18 DIAGNOSIS — F20.9 SCHIZOPHRENIA, UNSPECIFIED: ICD-10-CM

## 2023-04-18 PROCEDURE — 99214 OFFICE O/P EST MOD 30 MIN: CPT | Mod: 95

## 2023-04-19 ENCOUNTER — APPOINTMENT (OUTPATIENT)
Dept: PSYCHIATRY | Facility: CLINIC | Age: 23
End: 2023-04-19

## 2023-04-19 ENCOUNTER — OUTPATIENT (OUTPATIENT)
Dept: OUTPATIENT SERVICES | Facility: HOSPITAL | Age: 23
LOS: 1 days | End: 2023-04-19

## 2023-04-19 DIAGNOSIS — F20.9 SCHIZOPHRENIA, UNSPECIFIED: ICD-10-CM

## 2023-04-20 DIAGNOSIS — F20.9 SCHIZOPHRENIA, UNSPECIFIED: ICD-10-CM

## 2023-04-24 ENCOUNTER — OUTPATIENT (OUTPATIENT)
Dept: OUTPATIENT SERVICES | Facility: HOSPITAL | Age: 23
LOS: 1 days | End: 2023-04-24

## 2023-04-24 ENCOUNTER — APPOINTMENT (OUTPATIENT)
Dept: PSYCHIATRY | Facility: CLINIC | Age: 23
End: 2023-04-24

## 2023-04-24 DIAGNOSIS — F20.9 SCHIZOPHRENIA, UNSPECIFIED: ICD-10-CM

## 2023-04-24 NOTE — REASON FOR VISIT
[Patient preference] : as per patient preference [Continuing, patient not seen in-person within last 12 months (provide details below)] : Telehealth services are continuing, patient not seen in-person within last 12 months.  [Telehealth (audio & video) - Individual/Group] : This visit was provided via telehealth using real-time 2-way audio visual technology. [Other Location: e.g. Home (Enter Location, City,State)___] : The provider was located at [unfilled]. [Home] : The patient, [unfilled], was located at home, [unfilled], at the time of the visit. [Verbal consent obtained from patient/other participant(s)] : Verbal consent for telehealth/telephonic services obtained from patient/other participant(s) [FreeTextEntry4] : 2:45pm [FreeTextEntry5] : 3:40pm  [Patient] : Patient [FreeTextEntry1] : Supportive employment and educational services.\par

## 2023-04-24 NOTE — PLAN
[FreeTextEntry2] :  Shakila will create meaningful academic and occupational engagements.  [Skills training (all types)] : Skills training (all types)  [Other: ____] : [unfilled] [de-identified] : Shakila continues to engage weekly with the SEES while working on his goals which is to create meaningful academic and occupational engagements. Shakila reported that he continues to explore work options and would like to explore this more with the SEES. Shakila explained that he would like to make some money to pay for a trip that he will be taking with his mother on a cruise at the end of the summer. While exploring some work options, Shakila reported that he would be staying the majority of the summer with his cousin in [place]. SEES questioned whether getting a job in [place] would be a good idea if he would be staying in [place]. Shakila mentioned the thought of applying for jobs near his cousins home in [place] so that he can work there and be close. SEES observed that Shakila was a bit uncertain where he wants to look for work. SEES explained to Shakila that instead of getting back into this reoccurring cycle to really understand what he plans on doing for the summer so that he can explore options that would better suit his needs. Shakila agreed that he needs to think this over before jumping into the application process and would like to navigate this more with the SEES during the next few meetings.  [Recommended Frequency of Visits: ____] : Recommended frequency of visits: [unfilled] [Return in ____ week(s)] : Return in [unfilled] week(s) [FreeTextEntry1] : Shakila will meet with the SEES again on 4/24.

## 2023-04-25 DIAGNOSIS — F20.9 SCHIZOPHRENIA, UNSPECIFIED: ICD-10-CM

## 2023-04-25 NOTE — END OF VISIT
[Individual Psychotherapy for 38-52 minutes] : Individual Psychotherapy for 38 - 52 minutes [Teletherapy Service Provided] : The services provided in this session were delivered via tele-therapy [FreeTextEntry3] : at his cousins [place] in [place]. [FreeTextEntry5] : [address] [place] [address]

## 2023-04-25 NOTE — PLAN
[Skills training (all types)] : Skills training (all types)  [Other: ____] : [unfilled] [FreeTextEntry2] : To obtain employment and reach educational goals.\par  [de-identified] : Shakila continues to engage weekly with the SEES while working on his goal which is to obtain employment and reach educational goals. Shaklia reported that he was currently staying with his cousin in [place]. Shakila reported that he was still looking for work and had made the decision that he would like to work in [place] rather than [place]. Shakila reported that he will be with his cousin until next week when he will return back home. SEES questioned what he will do if he continues to apply for jobs and is unable to get to an interview if they call him in if he is in another state. Shakila reported that he will try and schedule the interview for when he returns home. SEES continues to explore work options with him and discuss venturing into other areas of work that he has not looked into yet. Shakila was a bit preoccupied while staying with his cousin, making the conversation with SEES regarding work to be difficult. SEES offered to meet again with Shakila next week to continue the employment search.\par  [Recommended Frequency of Visits: ____] : Recommended frequency of visits: [unfilled] [Return in ____ week(s)] : Return in [unfilled] week(s) [FreeTextEntry1] : Shakila will meet with the SEES again on 5/1.

## 2023-04-25 NOTE — REASON FOR VISIT
[Other Location: e.g. School (Enter Location, City,State)___] : at [unfilled], at the time of the visit. [Patient preference] : as per patient preference [Continuing, patient not seen in-person within last 12 months (provide details below)] : Telehealth services are continuing, patient not seen in-person within last 12 months.  [Telehealth (audio & video) - Individual/Group] : This visit was provided via telehealth using real-time 2-way audio visual technology. [Medical Office: ([facility])___] : The provider was located at the medical office in [unfilled]. [Other Location: e.g. Home (Enter Location, City,State)___] : The patient, [unfilled], was located at [unfilled] at the time of the visit. [Verbal consent obtained from patient/other participant(s)] : Verbal consent for telehealth/telephonic services obtained from patient/other participant(s) [Patient] : Patient [FreeTextEntry4] : 3:30pm [FreeTextEntry5] : 4:15pm [FreeTextEntry1] : Supportive employment and educational services.\par

## 2023-05-01 ENCOUNTER — APPOINTMENT (OUTPATIENT)
Dept: PSYCHIATRY | Facility: CLINIC | Age: 23
End: 2023-05-01

## 2023-05-01 ENCOUNTER — OUTPATIENT (OUTPATIENT)
Dept: OUTPATIENT SERVICES | Facility: HOSPITAL | Age: 23
LOS: 1 days | End: 2023-05-01

## 2023-05-01 DIAGNOSIS — F20.9 SCHIZOPHRENIA, UNSPECIFIED: ICD-10-CM

## 2023-05-02 ENCOUNTER — OUTPATIENT (OUTPATIENT)
Dept: OUTPATIENT SERVICES | Facility: HOSPITAL | Age: 23
LOS: 1 days | End: 2023-05-02
Payer: COMMERCIAL

## 2023-05-02 ENCOUNTER — APPOINTMENT (OUTPATIENT)
Dept: PSYCHIATRY | Facility: CLINIC | Age: 23
End: 2023-05-02

## 2023-05-02 DIAGNOSIS — F20.9 SCHIZOPHRENIA, UNSPECIFIED: ICD-10-CM

## 2023-05-02 PROCEDURE — 90834 PSYTX W PT 45 MINUTES: CPT | Mod: 95

## 2023-05-03 NOTE — PLAN
[FreeTextEntry2] : Substance abuse counseling  [Psychoeducation] : Psychoeducation  [de-identified] : Met with Shakila via [employer] for session.  The session focused on engaging over the events of the last 3 weeks and counseling surrounding his vaping (nicotine).  Shakila stated he had a nice time on his vacation with his family and friends.  He stated that he has been feeling drowsy and slept more than he wanted to on the cruise.  He shared that he spent time with his cousin that has been diagnosed with cancer and going through treatments.  Shakila reported he had a nice time and is happy to see his cousin is feeling better and his prognosis is good.  While we were speaking Shakila was vaping.  He explained that he has been feeling lonely lately and the only thing he can do is vape and watch movies.  PC educated Shakila about the harms of vaping.  Shakila was open to listening, however, he states at this time he does not want to quit.  He shared that he would like to start dating again.  We spoke about the logistics and if he was feeling emotionally ready to date.  We spoke about his previous relationship and he concluded that maybe he is ready for something casual.   [Recommended Frequency of Visits: ____] : Recommended frequency of visits: [unfilled] [Return in ____ week(s)] : Return in [unfilled] week(s) [FreeTextEntry1] : Will discuss relationship thoughts and plans during our next session.  I will revisit vaping in 30 days.  Next session is 05/09/2023 at 1330.

## 2023-05-03 NOTE — REASON FOR VISIT
[Self] : self [Patient preference] : as per patient preference [Continuing, patient not seen in-person within last 12 months (provide details below)] : Telehealth services are continuing, patient not seen in-person within last 12 months.  [Telehealth (audio & video) - Individual/Group] : This visit was provided via telehealth using real-time 2-way audio visual technology. [Medical Office: ([facility])___] : The provider was located at the medical office in [unfilled]. [Home] : The patient, [unfilled], was located at home, [unfilled], at the time of the visit. [FreeTextEntry4] : 9502 [facility] : 8543 [Patient] : Patient [FreeTextEntry1] : [address] [place] [address]\par

## 2023-05-03 NOTE — RISK ASSESSMENT
[No, patient denies ideation or behavior] : No, patient denies ideation or behavior [FreeTextEntry3] : not clinically indicated

## 2023-05-03 NOTE — END OF VISIT
[Duration of Psychotherapy Visit (minutes spent in synchronous communication): ____] : Duration of Psychotherapy Visit (minutes spent in synchronous communication): [unfilled] [Individual Psychotherapy for 38-52 minutes] : Individual Psychotherapy for 38 - 52 minutes [Teletherapy Service Provided] : The services provided in this session were delivered via tele-therapy [FreeTextEntry3] : home [FreeTextEntry5] : [address] [place] [address]

## 2023-05-05 ENCOUNTER — APPOINTMENT (OUTPATIENT)
Dept: PSYCHIATRY | Facility: CLINIC | Age: 23
End: 2023-05-05

## 2023-05-08 ENCOUNTER — APPOINTMENT (OUTPATIENT)
Dept: PSYCHIATRY | Facility: CLINIC | Age: 23
End: 2023-05-08

## 2023-05-09 ENCOUNTER — APPOINTMENT (OUTPATIENT)
Dept: PSYCHIATRY | Facility: CLINIC | Age: 23
End: 2023-05-09

## 2023-06-01 ENCOUNTER — APPOINTMENT (OUTPATIENT)
Dept: PSYCHIATRY | Facility: CLINIC | Age: 23
End: 2023-06-01

## 2023-06-15 ENCOUNTER — OUTPATIENT (OUTPATIENT)
Dept: OUTPATIENT SERVICES | Facility: HOSPITAL | Age: 23
LOS: 1 days | End: 2023-06-15

## 2023-06-15 ENCOUNTER — APPOINTMENT (OUTPATIENT)
Dept: PSYCHIATRY | Facility: CLINIC | Age: 23
End: 2023-06-15

## 2023-06-15 DIAGNOSIS — F20.9 SCHIZOPHRENIA, UNSPECIFIED: ICD-10-CM

## 2023-06-16 DIAGNOSIS — F20.9 SCHIZOPHRENIA, UNSPECIFIED: ICD-10-CM

## 2023-06-22 ENCOUNTER — OUTPATIENT (OUTPATIENT)
Dept: OUTPATIENT SERVICES | Facility: HOSPITAL | Age: 23
LOS: 1 days | End: 2023-06-22
Payer: COMMERCIAL

## 2023-06-22 ENCOUNTER — APPOINTMENT (OUTPATIENT)
Dept: PSYCHIATRY | Facility: CLINIC | Age: 23
End: 2023-06-22

## 2023-06-22 DIAGNOSIS — F20.9 SCHIZOPHRENIA, UNSPECIFIED: ICD-10-CM

## 2023-06-22 PROCEDURE — 90834 PSYTX W PT 45 MINUTES: CPT | Mod: 95

## 2023-06-23 ENCOUNTER — APPOINTMENT (OUTPATIENT)
Dept: PSYCHIATRY | Facility: CLINIC | Age: 23
End: 2023-06-23

## 2023-06-23 ENCOUNTER — OUTPATIENT (OUTPATIENT)
Dept: OUTPATIENT SERVICES | Facility: HOSPITAL | Age: 23
LOS: 1 days | End: 2023-06-23

## 2023-06-23 DIAGNOSIS — F20.9 SCHIZOPHRENIA, UNSPECIFIED: ICD-10-CM

## 2023-06-23 NOTE — HISTORY OF PRESENT ILLNESS
[FreeTextEntry1] : Shakila was seen for psychiatric follow-up.  \par Upon interview he reported he has been doing "good" but states he has still been "a little depressed."  He continues to report symptoms of low motivation, low energy, anhedonia, and difficulty concentrating.  He denied feeling hopeless.  He denied any thoughts of wanting to harm himself or others.  He reports he has been doing well in school.  He denied experiencing perceptual disturbances or paranoid/suspicious thoughts.  He reports he has not been able to make time for the gym due to studies but is confident he will be able to do this in the future.  He denied any side effects from the medication regimen. [FreeTextEntry2] : Pt reports he was [place] for having thoughts of not wanting to be alive. See previous history/records.\par \par recently admitted at EOB at [facility] : 7/19/22-7/21/22 after he presented himself to the ED for bruising. While he was in the EOB he was started on Depakene 1500mg at bedtime and Olanzapine was increased to 20mg at bedtime. \par

## 2023-06-23 NOTE — DISCUSSION/SUMMARY
[FreeTextEntry1] : Shakila Pacheco is 21 years old, , English speaking, single , currently employed, domiciled with his mother, with a history of having been diagnosed with schizophrenia. He was referred to [facility] program after his first IPP in [facility] for severe mood episode with psychotic features with resolved with addition of Olanzapine . Per the intake record, there might be another depressive episode that patient is experienced few months prior to his admission to [facility] ( reported by mother- depressed, withdwarn, excessive sleep and low appetite) Patient was diagnosed with Schizophreniform disorder and now given the duration of symptoms meets criteria for schizophrenia , however, considering his family history and his clinical presentation, Bipolar 1 disorder cannot be excluded at this time. Patient demonstrated relatively good control of active psychotic and mood symptoms on olanzapine 20 mg but complained of feeling very tired during the day and expressed concerns over increased appetite, tolerating lower Olanzapine 15 mg qhs without increase in mood or psychotic symptoms. He expressed a desire to decrease the Olanzapine to 10mg today and reported having discussed this with his mother. \par \par On evaluation today, Shakila is reporting that he feels his mood is low and he has low motivation to engage in activities he used to enjoy through shared decision making we made a plan to start an SSRI trial to see if his mood improves.  Previously he had been on Prozac but he was open to starting something different today.  He denied thoughts of suicide or not wanting to be alive. He denied symptoms of psychosis. He reported abstaining from cannabis use. \par

## 2023-06-23 NOTE — SOCIAL HISTORY
[FreeTextEntry1] : Pt reports he was previously hospitalized [place] for having thoughts of not wanting to be alive. See previous history/records.\par \par recently admitted at EOB at [facility] : 7/19/22-7/21/22 after he presented himself to the ED for bruising. While he was in the EOB he was started on Depakene 1500mg at bedtime and Olanzapine was increased to 20mg at bedtime. \par \par \par history of cannabis use disorder\par current use of nicotine vaporizer, reports cutting back\par Drinks alcohol socially and not more than two drinks at a time.

## 2023-06-23 NOTE — PHYSICAL EXAM
[Cooperative] : cooperative [Depressed] : depressed [Full] : full [Clear] : clear [Linear/Goal Directed] : linear/goal directed [Average] : average [WNL] : within normal limits [FreeTextEntry8] : "Still a little depressed"

## 2023-06-23 NOTE — REASON FOR VISIT
[facility] DIVISION  Dr Sheba Almonte  Post-Operative Instructions    1  Take your prescribed medication as directed  2  Upon arrival at home, lie down and elevate your surgical foot on 2 pillows  3  Remain quiet, off your feet as much as possible, for the first 24-48 hours  This is when your feet first swell and may become painful  After 48 hours you may begin limited walking following these restrictions:   Weightbear as tolerated to surgical foot  4  Drink large quantities of water and citrus fruit juice  Consume no alcohol  Continue a well-balanced diet  5  Report any unusual discomfort or fever to this office  6  A limited amount of discomfort and swelling is to be expected  In some cases the skin may take on a bruised appearance  The surgical solution that was applied to your foot prior to the operation is dark in color and the operation site may appear to be oozing when it actually is not  7  A slight amount of blood is to be expected, and is no cause for alarm  Do not remove the dressings  If there is active bleeding and if the bleeding persists, add additional gauze to the bandage, apply direct pressure, elevate your feet and call this office  8  Do not get the dressings wet  As regular bathing may be inconvenient, sponge baths are recommended  9  When anesthesia wears off and if any discomfort should be present, apply an ice pack directly over the operated area for 15 minute intervals for several hours or until the pain leaves  (USE IN EXCESS OF 15 MINUTES COULD CAUSE FROSTBITE)  Do not use hot water bags or electric pads  A convenient icepack can be made by placing ice cubes in a plastic bag and covering this with a towel  10  If necessary, take a mild laxative before retiring  11  Wear your special open shoes anytime you put weight on your foot, even if it is just to walk to the bathroom and back   It will probably be 2 or 3 weeks before you will be permitted to try regular an   12  Having performed the operation, we are interested in a prompt recovery  Please cooperate by following the above instructions  13  Please call to confirm your post-op appointment or call with any other questions  Oxycodone/Acetaminophen (By mouth)   Acetaminophen (o-sitj-o-MIN-oh-fen), Oxycodone Hydrochloride (io-c-EMH-done cheng-droe-KLOR-otis)  Treats moderate to moderately severe pain  This medicine is a narcotic pain reliever  Brand Name(s): Endocet, Percocet, Primlev, Xartemis XR   There may be other brand names for this medicine  When This Medicine Should Not Be Used: This medicine is not right for everyone  Do not use it if you had an allergic reaction to acetaminophen or oxycodone, or if you have serious breathing problems or paralytic ileus  How to Use This Medicine:   Capsule, Liquid, Tablet, Long Acting Tablet  · Your doctor will tell you how much medicine to use  Do not use more than directed  · An overdose can be dangerous  Follow directions carefully so you do not get too much medicine at one time  · Oral liquid: Measure the oral liquid medicine with a marked measuring spoon, oral syringe, or medicine cup  · Swallow the extended-release tablet whole  Do not crush, break, or chew it  Do not lick or wet the tablet before placing it in your mouth  Do not give this medicine through a feeding tube  · This medicine should come with a Medication Guide  Ask your pharmacist for a copy if you do not have one  · Missed dose: If you miss a dose of this medicine, skip the missed dose and go back to your regular dosing schedule  Do not double doses  · Store the medicine in a closed container at room temperature, away from heat, moisture, and direct light  Ask your pharmacist about the best way to dispose of medicine you do not use    Drugs and Foods to Avoid:   Ask your doctor or pharmacist before using any other medicine, including over-the-counter medicines, vitamins, and herbal products  · Do not use Xartemis XR if you are using or have used an MAO inhibitor in the past 14 days  · Some medicines can affect how this medicine works  Tell your doctor if you are using any of the following:   ¨ Carbamazepine, erythromycin, ketoconazole, lamotrigine, mirtazapine, naltrexone, phenytoin, propranolol, rifampin, ritonavir, tramadol, trazodone, or zidovudine  ¨ Birth control pills  ¨ Diuretic (water pill)  ¨ Medicine to treat depression  ¨ Phenothiazine medicine  ¨ Triptan medicine to treat migraine headaches  · Do not drink alcohol while you are using this medicine  Acetaminophen can damage your liver, and alcohol can increase this risk  Do not take acetaminophen without asking your doctor if you have 3 or more drinks of alcohol every day  · Tell your doctor if you use anything else that makes you sleepy  Some examples are allergy medicine, narcotic pain medicine, and alcohol  Tell your doctor if you are using buprenorphine, butorphanol, nalbuphine, pentazocine, a benzodiazepine, or a muscle relaxer  Warnings While Using This Medicine:   · Tell your doctor if you are pregnant or breastfeeding, or if you have kidney disease, liver disease, heart disease, low blood pressure, breathing problems or lung disease (such as asthma, COPD), thyroid problems, Grant disease, pancreas or gallbladder problems, prostate problems, trouble urinating, or a stomach problems, or a history of head injury or brain damage, seizures, or alcohol or drug abuse  Tell your doctor if you are allergic to codeine  · This medicine may cause the following problems:  ¨ High risk of overdose, which can lead to death  ¨ Respiratory depression (serious breathing problem that can be life-threatening)  ¨ Liver problems  ¨ Serious skin reactions  ¨ Serotonin syndrome (when used with certain medicines)  · This medicine may make you dizzy or drowsy   Do not drive or do anything that could be dangerous until you know how this medicine affects you  Sit or lie down if you feel dizzy  Stand up carefully  · This medicine contains acetaminophen  Read the labels of all other medicines you are using to see if they also contain acetaminophen, or ask your doctor or pharmacist  Willie Mooring not use more than 4 grams (4,000 milligrams) total of acetaminophen in one day  · This medicine can be habit-forming  Do not use more than your prescribed dose  Call your doctor if you think your medicine is not working  · Do not stop using this medicine suddenly  Your doctor will need to slowly decrease your dose before you stop it completely  · This medicine could cause infertility  Talk with your doctor before using this medicine if you plan to have children  · This medicine may cause constipation, especially with long-term use  Ask your doctor if you should use a laxative to prevent and treat constipation  · Keep all medicine out of the reach of children  Never share your medicine with anyone    Possible Side Effects While Using This Medicine:   Call your doctor right away if you notice any of these side effects:  · Allergic reaction: Itching or hives, swelling in your face or hands, swelling or tingling in your mouth or throat, chest tightness, trouble breathing  · Anxiety, restlessness, fast heartbeat, fever, muscle spasms, twitching, diarrhea, seeing or hearing things that are not there  · Blistering, peeling, red skin rash  · Blue lips, fingernails, or skin  · Dark urine or pale stools, loss of appetite, stomach pain, yellow skin or eyes  · Extreme weakness, shallow breathing, uneven heartbeat, seizures, sweating, or cold or clammy skin  · Severe confusion, lightheadedness, dizziness, or fainting  · Severe constipation, nausea, or vomiting  · Trouble breathing or slow breathing  If you notice these less serious side effects, talk with your doctor:   · Headache  · Mild constipation, nausea, or vomiting  · Mild sleepiness or drowsiness  If you notice other side effects that you think are caused by this medicine, tell your doctor  Call your doctor for medical advice about side effects  You may report side effects to FDA at 0-480-FDA-6836  © 2017 [address] [employer] [employer] Information is for End User's use only and may not be sold, redistributed or otherwise used for commercial purposes  The above information is an  only  It is not intended as medical advice for individual conditions or treatments  Talk to your doctor, nurse or pharmacist before following any medical regimen to see if it is safe and effective for you  [Patient preference] : as per patient preference [Telehealth (audio & video) - Individual/Group] : This visit was provided via telehealth using real-time 2-way audio visual technology. [Medical Office: ([facility])___] : The provider was located at the medical office in [unfilled]. [Home] : The patient, [unfilled], was located at home, [unfilled], at the time of the visit. [Verbal consent obtained from patient/other participant(s)] : Verbal consent for telehealth/telephonic services obtained from patient/other participant(s) [Patient] : Patient [Mother] : mother [FreeTextEntry4] : 3:31pm [FreeTextEntry5] : 4pm [FreeTextEntry1] : "I am doing good."

## 2023-06-23 NOTE — PLAN
[Medication education provided] : Medication education provided. [Rationale for medication choices, possible risks/precautions, benefits, alternative treatment choices, and consequences of non-treatment discussed] : Rationale for medication choices, possible risks/precautions, benefits, alternative treatment choices, and consequences of non-treatment discussed with patient/family/caregiver  [FreeTextEntry4] : Goal 1: Prevent reoccurrence of psychosis and maintain health. \par Goal keyword or goal statement: identify antecedents to psychosis Assessment of progress on goal/objective: Shakila has identified that stress of dating has been a trigger to feelings of depression and isolation. He is currently involved in a relationship and he states this has been "good for me." However, there are other girls that are interested in him and this makes balancing his work and personal life difficult and stressful. 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 Jesenia, Shakila's mother is no longer able to attend the family support meetings because she has returned to working in her office full-time. Jesenia remains involved and reaches out to the team when needed. Shakila states he is struggling with incorporating physical exercise into his life. Shakila is no longer considering a gym membership as he has a physically demanding job. ANTIONETTE and Shakila discussed maintaining contact with the team to add to consistency of his treatment.\par  [FreeTextEntry5] : #). continue Metformin 100mg XR\par #). Continue Olanzapine 15mg daily to address low energy and feeling sedated during the day\par #). Increase Lexapro 5mg to 10mg daily \par #). MI used to foster motivation for recovery\par #). Continue with [facility] therapist and [facility]

## 2023-06-24 DIAGNOSIS — F20.9 SCHIZOPHRENIA, UNSPECIFIED: ICD-10-CM

## 2023-06-25 NOTE — END OF VISIT
[Duration of Psychotherapy Visit (minutes spent in synchronous communication): ____] : Duration of Psychotherapy Visit (minutes spent in synchronous communication): [unfilled] [Individual Psychotherapy for 38-52 minutes] : Individual Psychotherapy for 38 - 52 minutes [Teletherapy Service Provided] : The services provided in this session were delivered via tele-therapy [FreeTextEntry3] : home [FreeTextEntry5] : [address] [place] [address]

## 2023-06-25 NOTE — REASON FOR VISIT
[Self] : self [Patient preference] : as per patient preference [Continuing, patient not seen in-person within last 12 months (provide details below)] : Telehealth services are continuing, patient not seen in-person within last 12 months.  [Telehealth (audio & video) - Individual/Group] : This visit was provided via telehealth using real-time 2-way audio visual technology. [Medical Office: ([facility])___] : The provider was located at the medical office in [unfilled]. [Home] : The patient, [unfilled], was located at home, [unfilled], at the time of the visit. [Patient] : Patient [FreeTextEntry4] : 1102 [[facility]] : 7819 [FreeTextEntry1] : continued psychotherapy\par

## 2023-06-25 NOTE — PHYSICAL EXAM
[Cooperative] : cooperative [Depressed] : depressed [Full] : full [Clear] : clear [Linear/Goal Directed] : linear/goal directed [Average] : average [WNL] : within normal limits [FreeTextEntry8] : c/o lack of motivation, low energy and sleeping a lot

## 2023-06-25 NOTE — PLAN
[FreeTextEntry2] : PC helped Shakila to identify feelings that impact his mood and motivation.   [Psychoeducation] : Psychoeducation  [de-identified] : Met with Shakila via [employer] for session.  The session focused on Shakila's feelings of sadness and lack of energy and motivation to accomplish things he would like to do.  Shakila explained that he often thinks about his past "wrongs" and gets stuck in these thoughts.  When I tried to explore this further he changed the subject abruptly.  I drew attention to this and he validated that he notices that he often avoids difficult conversations.  He states he is unsure why.  I brought him back to the topic.   He recognized that thinking or talking about him makes him feel uncomfortable. I educated Shakila about the need to talk about these events/issues that have that impact him.  Shakila acknowledged understanding and we discussed what occurred this morning.  He explained that his mom broke her ankle and she reached out to him several times buy phone and text and he never responded, because he was asleep.  He repeated that he was a "bad son."  I normalized sleeping through phone calls especially when feeling very tired.  Asked Shakila to consider why he would feel "guilty?"  Shakila struggled with answering this and identified that it is because of all he has done in his past.  I encouraged Shakila to continue to discuss this in future sessions.  He agreed and for HW I asked Shakila to identify things that make him feel happy or increase his mood.  I advised Shakila to do one of those identified activities today for the good session he had. \par  [Recommended Frequency of Visits: ____] : Recommended frequency of visits: [unfilled] [Return in ____ week(s)] : Return in [unfilled] week(s) [FreeTextEntry1] : Complete homework and next session will be on 06/29/2023 at 1700.

## 2023-06-29 ENCOUNTER — OUTPATIENT (OUTPATIENT)
Dept: OUTPATIENT SERVICES | Facility: HOSPITAL | Age: 23
LOS: 1 days | End: 2023-06-29
Payer: COMMERCIAL

## 2023-06-29 ENCOUNTER — APPOINTMENT (OUTPATIENT)
Dept: PSYCHIATRY | Facility: CLINIC | Age: 23
End: 2023-06-29

## 2023-06-29 DIAGNOSIS — F20.9 SCHIZOPHRENIA, UNSPECIFIED: ICD-10-CM

## 2023-06-29 PROCEDURE — 90834 PSYTX W PT 45 MINUTES: CPT | Mod: 95

## 2023-06-30 ENCOUNTER — APPOINTMENT (OUTPATIENT)
Dept: PSYCHIATRY | Facility: CLINIC | Age: 23
End: 2023-06-30

## 2023-06-30 DIAGNOSIS — F20.9 SCHIZOPHRENIA, UNSPECIFIED: ICD-10-CM

## 2023-07-13 ENCOUNTER — APPOINTMENT (OUTPATIENT)
Dept: PSYCHIATRY | Facility: CLINIC | Age: 23
End: 2023-07-13

## 2023-07-13 ENCOUNTER — OUTPATIENT (OUTPATIENT)
Dept: OUTPATIENT SERVICES | Facility: HOSPITAL | Age: 23
LOS: 1 days | End: 2023-07-13
Payer: COMMERCIAL

## 2023-07-13 DIAGNOSIS — F20.9 SCHIZOPHRENIA, UNSPECIFIED: ICD-10-CM

## 2023-07-13 PROCEDURE — 90834 PSYTX W PT 45 MINUTES: CPT | Mod: 95

## 2023-07-14 ENCOUNTER — APPOINTMENT (OUTPATIENT)
Dept: PSYCHIATRY | Facility: CLINIC | Age: 23
End: 2023-07-14

## 2023-07-14 DIAGNOSIS — F20.9 SCHIZOPHRENIA, UNSPECIFIED: ICD-10-CM

## 2023-07-20 ENCOUNTER — APPOINTMENT (OUTPATIENT)
Dept: PSYCHIATRY | Facility: CLINIC | Age: 23
End: 2023-07-20

## 2023-07-20 ENCOUNTER — OUTPATIENT (OUTPATIENT)
Dept: OUTPATIENT SERVICES | Facility: HOSPITAL | Age: 23
LOS: 1 days | End: 2023-07-20
Payer: COMMERCIAL

## 2023-07-20 DIAGNOSIS — F20.9 SCHIZOPHRENIA, UNSPECIFIED: ICD-10-CM

## 2023-07-20 PROCEDURE — 90834 PSYTX W PT 45 MINUTES: CPT | Mod: 95

## 2023-07-21 ENCOUNTER — APPOINTMENT (OUTPATIENT)
Dept: PSYCHIATRY | Facility: CLINIC | Age: 23
End: 2023-07-21

## 2023-07-21 DIAGNOSIS — F20.9 SCHIZOPHRENIA, UNSPECIFIED: ICD-10-CM

## 2023-07-27 ENCOUNTER — OUTPATIENT (OUTPATIENT)
Dept: OUTPATIENT SERVICES | Facility: HOSPITAL | Age: 23
LOS: 1 days | End: 2023-07-27
Payer: COMMERCIAL

## 2023-07-27 ENCOUNTER — APPOINTMENT (OUTPATIENT)
Dept: PSYCHIATRY | Facility: CLINIC | Age: 23
End: 2023-07-27

## 2023-07-27 DIAGNOSIS — F20.9 SCHIZOPHRENIA, UNSPECIFIED: ICD-10-CM

## 2023-07-27 PROCEDURE — 90832 PSYTX W PT 30 MINUTES: CPT | Mod: 95

## 2023-07-28 ENCOUNTER — APPOINTMENT (OUTPATIENT)
Dept: PSYCHIATRY | Facility: CLINIC | Age: 23
End: 2023-07-28

## 2023-07-28 ENCOUNTER — OUTPATIENT (OUTPATIENT)
Dept: OUTPATIENT SERVICES | Facility: HOSPITAL | Age: 23
LOS: 1 days | End: 2023-07-28

## 2023-07-28 DIAGNOSIS — F20.9 SCHIZOPHRENIA, UNSPECIFIED: ICD-10-CM

## 2023-07-29 DIAGNOSIS — F20.9 SCHIZOPHRENIA, UNSPECIFIED: ICD-10-CM

## 2023-08-01 ENCOUNTER — OUTPATIENT (OUTPATIENT)
Dept: OUTPATIENT SERVICES | Facility: HOSPITAL | Age: 23
LOS: 1 days | End: 2023-08-01

## 2023-08-01 ENCOUNTER — APPOINTMENT (OUTPATIENT)
Dept: PSYCHIATRY | Facility: CLINIC | Age: 23
End: 2023-08-01

## 2023-08-01 DIAGNOSIS — F20.9 SCHIZOPHRENIA, UNSPECIFIED: ICD-10-CM

## 2023-08-02 DIAGNOSIS — F20.9 SCHIZOPHRENIA, UNSPECIFIED: ICD-10-CM

## 2023-08-03 ENCOUNTER — OUTPATIENT (OUTPATIENT)
Dept: OUTPATIENT SERVICES | Facility: HOSPITAL | Age: 23
LOS: 1 days | End: 2023-08-03
Payer: COMMERCIAL

## 2023-08-03 ENCOUNTER — APPOINTMENT (OUTPATIENT)
Dept: PSYCHIATRY | Facility: CLINIC | Age: 23
End: 2023-08-03

## 2023-08-03 DIAGNOSIS — F20.9 SCHIZOPHRENIA, UNSPECIFIED: ICD-10-CM

## 2023-08-03 PROCEDURE — 90834 PSYTX W PT 45 MINUTES: CPT | Mod: 95

## 2023-08-04 DIAGNOSIS — F20.9 SCHIZOPHRENIA, UNSPECIFIED: ICD-10-CM

## 2023-08-07 NOTE — PLAN
[FreeTextEntry2] : Shakila will develop his own understanding of his past experiences; he will learn to reflect versus dwell over his perceived past mistakes.  Dulce Maria will engage in psychotherapy weekly for 45 minutes with PC.   [de-identified] : et with Shakila via [employer] for session.  The session focused on Shakila's sharing his memories of his past, focusing on his high school years.  Shakila shared some of his experiences and how he felt negatively impacted him and his family.  He recalled that his mom helped him through a lot of these times.  When he thinks about this, he states he has to convince himself that he is good by using self-talk.  He says, I'm good."  When I ask what feelings surface, he struggles to share them with me.  He will repeat, "I'm good."  He was able to identify that he feels ashamed and guilty because of what he put his mother through.  I educated Shakila on the difference between dwelling and reflecting, He was receptive to starting to view some of these events differently.  He stated he would prefer to learn from his past instead of allowing it to make him feel stuck.  [Recommended Frequency of Visits: ____] : Recommended frequency of visits: [unfilled] [Return in ____ week(s)] : Return in [unfilled] week(s) [FreeTextEntry1] : Next session is 08/10/2023 at 1700.  Shakila to select an event from his past that he would feel ready to disclose and we will extract the lessons together that he can learn from.

## 2023-08-07 NOTE — END OF VISIT
[Duration of Psychotherapy Visit (minutes spent in synchronous communication): ____] : Duration of Psychotherapy Visit (minutes spent in synchronous communication): [unfilled] [Teletherapy Service Provided] : The services provided in this session were delivered via tele-therapy [Individual Psychotherapy for 38-52 minutes] : Individual Psychotherapy for 38 - 52 minutes [FreeTextEntry3] : home  [FreeTextEntry5] : [address] [place] [address]

## 2023-08-07 NOTE — REASON FOR VISIT
[Patient preference] : as per patient preference [Continuing, patient not seen in-person within last 12 months (provide details below)] : Telehealth services are continuing, patient not seen in-person within last 12 months.  [Telehealth (audio & video) - Individual/Group] : This visit was provided via telehealth using real-time 2-way audio visual technology. [Medical Office: ([facility])___] : The provider was located at the medical office in [unfilled]. [Home] : The patient, [unfilled], was located at home, [unfilled], at the time of the visit. [FreeTextEntry4] : 4476  [[facility]] : 0774  [Patient] : Patient [FreeTextEntry1] : continued psychotherapy

## 2023-08-10 ENCOUNTER — NON-APPOINTMENT (OUTPATIENT)
Age: 23
End: 2023-08-10

## 2023-08-10 ENCOUNTER — APPOINTMENT (OUTPATIENT)
Dept: PSYCHIATRY | Facility: CLINIC | Age: 23
End: 2023-08-10

## 2023-08-10 ENCOUNTER — OUTPATIENT (OUTPATIENT)
Dept: OUTPATIENT SERVICES | Facility: HOSPITAL | Age: 23
LOS: 1 days | End: 2023-08-10
Payer: COMMERCIAL

## 2023-08-10 DIAGNOSIS — F20.9 SCHIZOPHRENIA, UNSPECIFIED: ICD-10-CM

## 2023-08-10 PROCEDURE — 90834 PSYTX W PT 45 MINUTES: CPT | Mod: 95

## 2023-08-11 DIAGNOSIS — F20.9 SCHIZOPHRENIA, UNSPECIFIED: ICD-10-CM

## 2023-08-14 NOTE — END OF VISIT
[Individual Psychotherapy for 53+ minutes] : Individual Psychotherapy for 53+ minutes  [Teletherapy Service Provided] : The services provided in this session were delivered via tele-therapy [FreeTextEntry3] : Home [FreeTextEntry5] : Home-Remote Work

## 2023-08-14 NOTE — PLAN
[FreeTextEntry2] :  Shakila will create meaningful academic and occupational engagements.  [Skills training (all types)] : Skills training (all types)  [Other: ____] : [unfilled] [de-identified] : Shakila continues to engage weekly with the [employer] and  while working on his goal which is to create meaningful academic and occupational engagements. 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 Supported [employer] and  explore some other opportunities with Shakila however, nothing seemed to have him entirely interested. Shakila mentioned that he really wants to go back to school and wanted to try this process again. Supported Employment and  explained that first since financial was an issue a while back, to explore what he would get financially from completing a FAFSA application. [employer]orted [employer] and  offered to assist him through this process. Shakila agreed that he would like to try the school route again.  [Recommended Frequency of Visits: ____] : Recommended frequency of visits: [unfilled] [Return in ____ week(s)] : Return in [unfilled] week(s) [FreeTextEntry1] : Shakila will meet with the [employer]orted [employer] and  again on 8/4.

## 2023-08-14 NOTE — REASON FOR VISIT
[Patient preference] : as per patient preference [Continuing, patient not seen in-person within last 12 months (provide details below)] : Telehealth services are continuing, patient not seen in-person within last 12 months.  [Telehealth (audio & video) - Individual/Group] : This visit was provided via telehealth using real-time 2-way audio visual technology. [Other Location: e.g. Home (Enter Location, City,State)___] : The provider was located at [unfilled]. [Home] : The patient, [unfilled], was located at home, [unfilled], at the time of the visit. [Verbal consent obtained from patient/other participant(s)] : Verbal consent for telehealth/telephonic services obtained from patient/other participant(s) [FreeTextEntry4] : 10:00am [FreeTextEntry5] : 11:00am [Patient] : Patient [FreeTextEntry1] : Supportive employment and educational services.

## 2023-08-15 ENCOUNTER — APPOINTMENT (OUTPATIENT)
Dept: PSYCHIATRY | Facility: CLINIC | Age: 23
End: 2023-08-15

## 2023-08-17 ENCOUNTER — OUTPATIENT (OUTPATIENT)
Dept: OUTPATIENT SERVICES | Facility: HOSPITAL | Age: 23
LOS: 1 days | End: 2023-08-17

## 2023-08-17 ENCOUNTER — APPOINTMENT (OUTPATIENT)
Dept: PSYCHIATRY | Facility: CLINIC | Age: 23
End: 2023-08-17

## 2023-08-17 DIAGNOSIS — F20.9 SCHIZOPHRENIA, UNSPECIFIED: ICD-10-CM

## 2023-08-17 NOTE — REASON FOR VISIT
[Patient preference] : as per patient preference [Continuing, patient not seen in-person within last 12 months (provide details below)] : Telehealth services are continuing, patient not seen in-person within last 12 months.  [Telehealth (audio & video) - Individual/Group] : This visit was provided via telehealth using real-time 2-way audio visual technology. [Other Location: e.g. Home (Enter Location, City,State)___] : The provider was located at [unfilled]. [Home] : The patient, [unfilled], was located at home, [unfilled], at the time of the visit. [Verbal consent obtained from patient/other participant(s)] : Verbal consent for telehealth/telephonic services obtained from patient/other participant(s) [FreeTextEntry4] : 11:10am [FreeTextEntry5] : 12:00pm [Patient] : Patient [FreeTextEntry1] : Supportive employment and educational services'

## 2023-08-17 NOTE — PLAN
[FreeTextEntry2] :  Shakila will create meaningful academic and occupational engagements.  [Skills training (all types)] : Skills training (all types)  [Other: ____] : [unfilled] [de-identified] : Shakila continues to engage with the Supported Employment and  weekly while working on his goal which is to create meaningful academic and occupational engagements. 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 Shakila requested assistance with completing financial aid application as per discussion from last meeting with the Supported [employer] and . Shakila's mother was also present for a portion of the meeting as well to assist with some of the questions from the financial aid. Supported Employment and  was able to assist Shakila with completing the [id number] FAFSA application and submit it successfully. Supported Employment and  advised that during next meeting, he should look into completing the [place] application as well to see what he would be awarded and gain a better understanding of how he should navigate his future with school. Shakila was appreciative of the [employer] and  assistance.  [Recommended Frequency of Visits: ____] : Recommended frequency of visits: [unfilled] [Return in ____ week(s)] : Return in [unfilled] week(s) [FreeTextEntry1] : Shakila will meet with the Supported Employment and  again on 8/15.

## 2023-08-18 DIAGNOSIS — F20.9 SCHIZOPHRENIA, UNSPECIFIED: ICD-10-CM

## 2023-08-22 ENCOUNTER — APPOINTMENT (OUTPATIENT)
Dept: PSYCHIATRY | Facility: CLINIC | Age: 23
End: 2023-08-22

## 2023-08-22 ENCOUNTER — OUTPATIENT (OUTPATIENT)
Dept: OUTPATIENT SERVICES | Facility: HOSPITAL | Age: 23
LOS: 1 days | End: 2023-08-22

## 2023-08-22 DIAGNOSIS — F20.9 SCHIZOPHRENIA, UNSPECIFIED: ICD-10-CM

## 2023-08-22 NOTE — END OF VISIT
[Individual Psychotherapy for 16-37 minutes] : Individual Psychotherapy for 16-37 minutes [Teletherapy Service Provided] : The services provided in this session were delivered via tele-therapy [FreeTextEntry3] : At this cousins in [place]. [FreeTextEntry5] : Home-Remote Work

## 2023-08-22 NOTE — PLAN
[FreeTextEntry2] :  Shakila will create meaningful academic and occupational engagements.  [Skills training (all types)] : Skills training (all types)  [Other: ____] : [unfilled] [de-identified] : Shakila continues to engage weekly with the [employer] and  while working on his goal which is to create meaningful academic and occupational engagements. Shakila reported that he was currently staying at his cousins house in [place]. Shakila seemed distracted during the meeting. Supported Employment and  followed up with Shakila regarding the financial aid application that he completed during last meeting with the [employer] and . Shakila reported that he was uncertain whether he received any information about his aid. Supported Employment and  offered to look into his student aid report to see whether he was granted and federal aid. Supported Employment and  discovered that he was denied [employer]. Supported Employment and  concluded the meeting due to Shakila not being focused in and having conversations with his cousins while Supp[employer] Employment and  was on video with him. Shakila agreed to meet and be more focused during next meeting when he will be home. [Recommended Frequency of Visits: ____] : Recommended frequency of visits: [unfilled] [Return in ____ week(s)] : Return in [unfilled] week(s) [FreeTextEntry1] : Shakila will meet with the [employer] Employment and  again on 8/29.

## 2023-08-22 NOTE — REASON FOR VISIT
[Patient preference] : as per patient preference [Continuing, patient not seen in-person within last 12 months (provide details below)] : Telehealth services are continuing, patient not seen in-person within last 12 months.  [Telehealth (audio & video) - Individual/Group] : This visit was provided via telehealth using real-time 2-way audio visual technology. [Other Location: e.g. Home (Enter Location, City,State)___] : The provider was located at [unfilled]. [Home] : The patient, [unfilled], was located at home, [unfilled], at the time of the visit. [Verbal consent obtained from patient/other participant(s)] : Verbal consent for telehealth/telephonic services obtained from patient/other participant(s) [FreeTextEntry4] : 2:04pm [FreeTextEntry5] : 2:36pm [Patient] : Patient [FreeTextEntry1] : Supportive employment and educational services.

## 2023-08-23 DIAGNOSIS — F20.9 SCHIZOPHRENIA, UNSPECIFIED: ICD-10-CM

## 2023-08-29 ENCOUNTER — APPOINTMENT (OUTPATIENT)
Dept: PSYCHIATRY | Facility: CLINIC | Age: 23
End: 2023-08-29

## 2023-08-31 ENCOUNTER — APPOINTMENT (OUTPATIENT)
Dept: PSYCHIATRY | Facility: CLINIC | Age: 23
End: 2023-08-31

## 2023-08-31 ENCOUNTER — OUTPATIENT (OUTPATIENT)
Dept: OUTPATIENT SERVICES | Facility: HOSPITAL | Age: 23
LOS: 1 days | End: 2023-08-31
Payer: COMMERCIAL

## 2023-08-31 DIAGNOSIS — F20.9 SCHIZOPHRENIA, UNSPECIFIED: ICD-10-CM

## 2023-08-31 PROCEDURE — 90832 PSYTX W PT 30 MINUTES: CPT | Mod: 95

## 2023-09-01 DIAGNOSIS — F20.9 SCHIZOPHRENIA, UNSPECIFIED: ICD-10-CM

## 2023-09-07 ENCOUNTER — APPOINTMENT (OUTPATIENT)
Dept: PSYCHIATRY | Facility: CLINIC | Age: 23
End: 2023-09-07

## 2023-09-14 ENCOUNTER — OUTPATIENT (OUTPATIENT)
Dept: OUTPATIENT SERVICES | Facility: HOSPITAL | Age: 23
LOS: 1 days | End: 2023-09-14

## 2023-09-14 ENCOUNTER — APPOINTMENT (OUTPATIENT)
Dept: PSYCHIATRY | Facility: CLINIC | Age: 23
End: 2023-09-14

## 2023-09-14 DIAGNOSIS — F20.9 SCHIZOPHRENIA, UNSPECIFIED: ICD-10-CM

## 2023-09-15 DIAGNOSIS — F20.9 SCHIZOPHRENIA, UNSPECIFIED: ICD-10-CM

## 2023-09-21 ENCOUNTER — APPOINTMENT (OUTPATIENT)
Dept: PSYCHIATRY | Facility: CLINIC | Age: 23
End: 2023-09-21

## 2023-09-26 ENCOUNTER — APPOINTMENT (OUTPATIENT)
Dept: PSYCHIATRY | Facility: CLINIC | Age: 23
End: 2023-09-26
Payer: COMMERCIAL

## 2023-09-26 ENCOUNTER — OUTPATIENT (OUTPATIENT)
Dept: OUTPATIENT SERVICES | Facility: HOSPITAL | Age: 23
LOS: 1 days | End: 2023-09-26
Payer: COMMERCIAL

## 2023-09-26 DIAGNOSIS — F20.9 SCHIZOPHRENIA, UNSPECIFIED: ICD-10-CM

## 2023-09-26 DIAGNOSIS — Z79.899 OTHER LONG TERM (CURRENT) DRUG THERAPY: ICD-10-CM

## 2023-09-26 PROCEDURE — 99214 OFFICE O/P EST MOD 30 MIN: CPT | Mod: 95,GC

## 2023-09-26 PROCEDURE — 99214 OFFICE O/P EST MOD 30 MIN: CPT | Mod: 95

## 2023-09-27 DIAGNOSIS — F20.9 SCHIZOPHRENIA, UNSPECIFIED: ICD-10-CM

## 2023-09-28 ENCOUNTER — APPOINTMENT (OUTPATIENT)
Dept: PSYCHIATRY | Facility: CLINIC | Age: 23
End: 2023-09-28

## 2023-10-05 ENCOUNTER — APPOINTMENT (OUTPATIENT)
Dept: PSYCHIATRY | Facility: CLINIC | Age: 23
End: 2023-10-05

## 2023-10-10 ENCOUNTER — APPOINTMENT (OUTPATIENT)
Dept: PSYCHIATRY | Facility: CLINIC | Age: 23
End: 2023-10-10

## 2023-10-12 ENCOUNTER — APPOINTMENT (OUTPATIENT)
Dept: PSYCHIATRY | Facility: CLINIC | Age: 23
End: 2023-10-12

## 2023-10-13 ENCOUNTER — OUTPATIENT (OUTPATIENT)
Dept: OUTPATIENT SERVICES | Facility: HOSPITAL | Age: 23
LOS: 1 days | End: 2023-10-13
Payer: COMMERCIAL

## 2023-10-13 ENCOUNTER — APPOINTMENT (OUTPATIENT)
Dept: PSYCHIATRY | Facility: CLINIC | Age: 23
End: 2023-10-13

## 2023-10-13 DIAGNOSIS — F20.9 SCHIZOPHRENIA, UNSPECIFIED: ICD-10-CM

## 2023-10-13 PROCEDURE — 90834 PSYTX W PT 45 MINUTES: CPT | Mod: 95

## 2023-10-14 DIAGNOSIS — F20.9 SCHIZOPHRENIA, UNSPECIFIED: ICD-10-CM

## 2023-10-16 ENCOUNTER — APPOINTMENT (OUTPATIENT)
Dept: PSYCHIATRY | Facility: CLINIC | Age: 23
End: 2023-10-16
Payer: COMMERCIAL

## 2023-10-16 ENCOUNTER — OUTPATIENT (OUTPATIENT)
Dept: OUTPATIENT SERVICES | Facility: HOSPITAL | Age: 23
LOS: 1 days | End: 2023-10-16
Payer: COMMERCIAL

## 2023-10-16 DIAGNOSIS — F20.9 SCHIZOPHRENIA, UNSPECIFIED: ICD-10-CM

## 2023-10-16 PROCEDURE — 99214 OFFICE O/P EST MOD 30 MIN: CPT | Mod: 95

## 2023-10-17 DIAGNOSIS — F20.9 SCHIZOPHRENIA, UNSPECIFIED: ICD-10-CM

## 2023-10-19 ENCOUNTER — APPOINTMENT (OUTPATIENT)
Dept: PSYCHIATRY | Facility: CLINIC | Age: 23
End: 2023-10-19

## 2023-10-25 ENCOUNTER — APPOINTMENT (OUTPATIENT)
Dept: PSYCHIATRY | Facility: CLINIC | Age: 23
End: 2023-10-25

## 2023-10-25 ENCOUNTER — OUTPATIENT (OUTPATIENT)
Dept: OUTPATIENT SERVICES | Facility: HOSPITAL | Age: 23
LOS: 1 days | End: 2023-10-25

## 2023-10-25 DIAGNOSIS — F20.9 SCHIZOPHRENIA, UNSPECIFIED: ICD-10-CM

## 2023-10-26 ENCOUNTER — APPOINTMENT (OUTPATIENT)
Dept: PSYCHIATRY | Facility: CLINIC | Age: 23
End: 2023-10-26

## 2023-10-26 ENCOUNTER — OUTPATIENT (OUTPATIENT)
Dept: OUTPATIENT SERVICES | Facility: HOSPITAL | Age: 23
LOS: 1 days | End: 2023-10-26
Payer: COMMERCIAL

## 2023-10-26 DIAGNOSIS — F20.9 SCHIZOPHRENIA, UNSPECIFIED: ICD-10-CM

## 2023-10-26 DIAGNOSIS — F25.0 SCHIZOAFFECTIVE DISORDER, BIPOLAR TYPE: ICD-10-CM

## 2023-10-26 PROCEDURE — 90834 PSYTX W PT 45 MINUTES: CPT | Mod: 95

## 2023-10-27 DIAGNOSIS — F25.0 SCHIZOAFFECTIVE DISORDER, BIPOLAR TYPE: ICD-10-CM

## 2023-10-30 ENCOUNTER — APPOINTMENT (OUTPATIENT)
Dept: PSYCHIATRY | Facility: CLINIC | Age: 23
End: 2023-10-30

## 2023-11-02 ENCOUNTER — APPOINTMENT (OUTPATIENT)
Dept: PSYCHIATRY | Facility: CLINIC | Age: 23
End: 2023-11-02

## 2023-12-07 ENCOUNTER — APPOINTMENT (OUTPATIENT)
Dept: PSYCHIATRY | Facility: CLINIC | Age: 23
End: 2023-12-07

## 2023-12-08 ENCOUNTER — APPOINTMENT (OUTPATIENT)
Dept: PSYCHIATRY | Facility: CLINIC | Age: 23
End: 2023-12-08

## 2023-12-08 ENCOUNTER — OUTPATIENT (OUTPATIENT)
Dept: OUTPATIENT SERVICES | Facility: HOSPITAL | Age: 23
LOS: 1 days | End: 2023-12-08
Payer: COMMERCIAL

## 2023-12-08 DIAGNOSIS — F20.9 SCHIZOPHRENIA, UNSPECIFIED: ICD-10-CM

## 2023-12-08 PROCEDURE — 90834 PSYTX W PT 45 MINUTES: CPT

## 2023-12-09 DIAGNOSIS — F20.9 SCHIZOPHRENIA, UNSPECIFIED: ICD-10-CM

## 2023-12-11 ENCOUNTER — APPOINTMENT (OUTPATIENT)
Dept: PSYCHIATRY | Facility: CLINIC | Age: 23
End: 2023-12-11

## 2023-12-11 ENCOUNTER — OUTPATIENT (OUTPATIENT)
Dept: OUTPATIENT SERVICES | Facility: HOSPITAL | Age: 23
LOS: 1 days | End: 2023-12-11

## 2023-12-11 DIAGNOSIS — F20.9 SCHIZOPHRENIA, UNSPECIFIED: ICD-10-CM

## 2023-12-12 ENCOUNTER — OUTPATIENT (OUTPATIENT)
Dept: OUTPATIENT SERVICES | Facility: HOSPITAL | Age: 23
LOS: 1 days | End: 2023-12-12

## 2023-12-12 ENCOUNTER — APPOINTMENT (OUTPATIENT)
Dept: PSYCHIATRY | Facility: CLINIC | Age: 23
End: 2023-12-12

## 2023-12-12 DIAGNOSIS — F20.9 SCHIZOPHRENIA, UNSPECIFIED: ICD-10-CM

## 2023-12-13 DIAGNOSIS — F20.9 SCHIZOPHRENIA, UNSPECIFIED: ICD-10-CM

## 2023-12-13 NOTE — END OF VISIT
[Duration of Psychotherapy Visit (minutes spent in synchronous communication): ____] : Duration of Psychotherapy Visit (minutes spent in synchronous communication): [unfilled] [Individual Psychotherapy for 38-52 minutes] : Individual Psychotherapy for 38 - 52 minutes [Teletherapy Service Provided] : The services provided in this session were delivered via tele-therapy [FreeTextEntry3] : home  [FreeTextEntry5] : remote telehealth hub

## 2023-12-13 NOTE — PHYSICAL EXAM
[Cooperative] : cooperative [Depressed] : depressed [Flat] : flat [Clear] : clear [Linear/Goal Directed] : linear/goal directed [None] : none [None Reported] : none reported [Average] : average [WNL] : within normal limits [Not applicable] : not applicable

## 2023-12-13 NOTE — PLAN
[Psychoeducation] : Psychoeducation  [Other: ____] : [unfilled] [de-identified] : Met with Shakila for session, via [facility] doc.  The session focused on his pattern of depression.  Shakila and I explored the impact that depression has on his life.  Shakila identifies his motivation is low and this makes it very difficult to improve his life by obtaining employment and pursuing his educational goals.  Shakila states this occurs every few weeks when he feels this depressed, he prefers to avoid and sleep.  Shakila recognizes he should use behavioral activation to stay moving in a positive direction. We spoke about attending therapy on a consistent basis and utilizing his family for support.  Shakila agrees and states he could get his mom involved.  Shakila identified that he is coming to an end with [facility] and needs to "step up and achieve.  Jesenia Shakila's mom was present for some of session and was encouraging Shakila to tell me more about how his mood has been fluctuating.    [Recommended Frequency of Visits: ____] : Recommended frequency of visits: [unfilled] [Return in ____ week(s)] : Return in [unfilled] week(s) [FreeTextEntry1] : Next session is 12/14/2023 at 1700, virtual, [place] to meet with the SEES prior to our session.

## 2023-12-13 NOTE — PLAN
[FreeTextEntry2] :  Shakila will create meaningful academic and occupational engagements.   [Skills training (all types)] : Skills training (all types)  [Other: ____] : [unfilled] [de-identified] : Shakila continues to engage weekly with the Supported Employment and  while working on his goal which is to create meaningful academic and occupational engagements. Shakila reported that he has not been doing much other than accompanying his mother to her doctor appointments. 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 Supported Employment and  explained that there was a new law that was just put in place that door [employer] workers will now be paid a salary on top of a possible tip. Shakila also mentioned that he tried to reach out to the local [email or web address] union for  workers to see how to get into the union but the office was closed. Supported Employment and  was able to help him navigate how reach out via email by assisting him with composing an email and providing him with the contact information. Shakila continues to explore work options with [employer].  [Recommended Frequency of Visits: ____] : Recommended frequency of visits: [unfilled] [Return in ____ week(s)] : Return in [unfilled] week(s) [FreeTextEntry1] :  Shakila will meet with the Supported Employment and  again on 12/18.

## 2023-12-13 NOTE — REASON FOR VISIT
[Patient preference] : as per patient preference [Continuing, patient not seen in-person within last 12 months (provide details below)] : Telehealth services are continuing, patient not seen in-person within last 12 months.  [Telehealth (audio & video) - Individual/Group] : This visit was provided via telehealth using real-time 2-way audio visual technology. [Other Location: e.g. Home (Enter Location, City,State)___] : The provider was located at [unfilled]. [Home] : The patient, [unfilled], was located at home, [unfilled], at the time of the visit. [Verbal consent obtained from patient/other participant(s)] : Verbal consent for telehealth/telephonic services obtained from patient/other participant(s) [FreeTextEntry4] : 3:30pm [FreeTextEntry5] : 4:15pm [Patient] : Patient [FreeTextEntry1] : Supportive employment and educational services.

## 2023-12-13 NOTE — REASON FOR VISIT
[Patient preference] : as per patient preference [Continuing, patient not seen in-person within last 12 months (provide details below)] : Telehealth services are continuing, patient not seen in-person within last 12 months.  [Telehealth (audio & video) - Individual/Group] : This visit was provided via telehealth using real-time 2-way audio visual technology. [Medical Office: ([facility])___] : The provider was located at the medical office in [unfilled]. [Home] : The patient, [unfilled], was located at home, [unfilled], at the time of the visit. [Verbal consent obtained from patient/other participant(s)] : Verbal consent for telehealth/telephonic services obtained from patient/other participant(s) [FreeTextEntry4] : 1500 [FreeTextEntry5] : 2150 [Patient with collateral] : Patient with collateral  [Mother] : mother [TextBox_17] : Jesenia  [FreeTextEntry1] : continued psychotherapy

## 2023-12-14 ENCOUNTER — OUTPATIENT (OUTPATIENT)
Dept: OUTPATIENT SERVICES | Facility: HOSPITAL | Age: 23
LOS: 1 days | End: 2023-12-14
Payer: COMMERCIAL

## 2023-12-14 ENCOUNTER — APPOINTMENT (OUTPATIENT)
Dept: PSYCHIATRY | Facility: CLINIC | Age: 23
End: 2023-12-14

## 2023-12-14 DIAGNOSIS — F20.9 SCHIZOPHRENIA, UNSPECIFIED: ICD-10-CM

## 2023-12-14 PROCEDURE — 90832 PSYTX W PT 30 MINUTES: CPT

## 2023-12-15 DIAGNOSIS — F20.9 SCHIZOPHRENIA, UNSPECIFIED: ICD-10-CM

## 2023-12-18 ENCOUNTER — APPOINTMENT (OUTPATIENT)
Dept: PSYCHIATRY | Facility: CLINIC | Age: 23
End: 2023-12-18

## 2023-12-19 ENCOUNTER — APPOINTMENT (OUTPATIENT)
Dept: PSYCHIATRY | Facility: CLINIC | Age: 23
End: 2023-12-19

## 2023-12-21 ENCOUNTER — APPOINTMENT (OUTPATIENT)
Dept: PSYCHIATRY | Facility: CLINIC | Age: 23
End: 2023-12-21

## 2024-01-04 ENCOUNTER — APPOINTMENT (OUTPATIENT)
Dept: PSYCHIATRY | Facility: CLINIC | Age: 24
End: 2024-01-04

## 2024-01-05 ENCOUNTER — OUTPATIENT (OUTPATIENT)
Dept: OUTPATIENT SERVICES | Facility: HOSPITAL | Age: 24
LOS: 1 days | End: 2024-01-05

## 2024-01-05 ENCOUNTER — APPOINTMENT (OUTPATIENT)
Dept: PSYCHIATRY | Facility: CLINIC | Age: 24
End: 2024-01-05

## 2024-01-05 DIAGNOSIS — F25.0 SCHIZOAFFECTIVE DISORDER, BIPOLAR TYPE: ICD-10-CM

## 2024-01-06 DIAGNOSIS — F25.0 SCHIZOAFFECTIVE DISORDER, BIPOLAR TYPE: ICD-10-CM

## 2024-01-08 ENCOUNTER — APPOINTMENT (OUTPATIENT)
Dept: PSYCHIATRY | Facility: CLINIC | Age: 24
End: 2024-01-08
Payer: COMMERCIAL

## 2024-01-08 ENCOUNTER — OUTPATIENT (OUTPATIENT)
Dept: OUTPATIENT SERVICES | Facility: HOSPITAL | Age: 24
LOS: 1 days | End: 2024-01-08
Payer: COMMERCIAL

## 2024-01-08 DIAGNOSIS — F20.9 SCHIZOPHRENIA, UNSPECIFIED: ICD-10-CM

## 2024-01-08 PROCEDURE — 99214 OFFICE O/P EST MOD 30 MIN: CPT | Mod: 95

## 2024-01-08 NOTE — HISTORY OF PRESENT ILLNESS
[FreeTextEntry1] : Shakila was seen for psychiatric follow up over telehealth platform. Upon interview, he reported his mood has been "good." He denied feeling depressed or hopeless. He denied any thoughts of suicide or thoughts of harming himself or others. He reported recently having more opportunities to hang out with friends which is giving him enjoyment. He reported sleeping from 11pm to 8am usually. He reported increased weight 220lbs up from 180lbs. He reported taking medication as prescribed. He denied other side effects. He reported plans to utilize lifestyle modifications to address the weight gain. He reports looking forward to his future.  [FreeTextEntry2] : Pt reports he was previously hospitalized [place] for having thoughts of not wanting to be alive. See previous history/records.  recently admitted at EOB at [facility] : 7/19/22-7/21/22 after he presented himself to the ED for bruising. While he was in the EOB he was started on Depakene 1500mg at bedtime and Olanzapine was increased to 20mg at bedtime.

## 2024-01-08 NOTE — REASON FOR VISIT
[Patient preference] : as per patient preference [Telehealth (audio & video) - Individual/Group] : This visit was provided via telehealth using real-time 2-way audio visual technology. [Medical Office: ([facility])___] : The provider was located at the medical office in [unfilled]. [Home] : The patient, [unfilled], was located at home, [unfilled], at the time of the visit. [Verbal consent obtained from patient/other participant(s)] : Verbal consent for telehealth/telephonic services obtained from patient/other participant(s) [FreeTextEntry4] : 1:30pm [FreeTextEntry5] : 1:55pm [Patient] : Patient [FreeTextEntry1] : "I'm OK"

## 2024-01-08 NOTE — PHYSICAL EXAM
[Cooperative] : cooperative [Euthymic] : euthymic [Full] : full [Clear] : clear [Linear/Goal Directed] : linear/goal directed [Average] : average [WNL] : within normal limits [FreeTextEntry8] : "not depressed."

## 2024-01-08 NOTE — PLAN
[Medication education provided] : Medication education provided. [Rationale for medication choices, possible risks/precautions, benefits, alternative treatment choices, and consequences of non-treatment discussed] : Rationale for medication choices, possible risks/precautions, benefits, alternative treatment choices, and consequences of non-treatment discussed with patient/family/caregiver  [FreeTextEntry4] : Goal 1: Prevent reoccurrence of psychosis and maintain health.  Goal keyword or goal statement: identify antecedents to psychosis Assessment of progress on goal/objective: Shakila has identified that stress of dating has been a trigger to feelings of depression and isolation. He is currently involved in a relationship and he states this has been "good for me." However, there are other girls that are interested in him and this makes balancing his work and personal life difficult and stressful. 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 Shakila Ba's mother is no longer able to attend the family support meetings because she has returned to working in her office full-time. Jesenia remains involved and reaches out to the team when needed. Shakila states he is struggling with incorporating physical exercise into his life. Shakila is no longer considering a gym membership as he has a physically demanding job. ANTIONETTE and Shakila discussed maintaining contact with the team to add to consistency of his treatment.  [FreeTextEntry5] : Continue Metformin to 1000mg BID for weight gain due to psychotropic medication. Continue Olanzapine 15mg daily, for a history of psychotic symptoms continue Lexapro 10mg daily, for a history of depression and anxiety Continue with [facility] therapist and [facility]

## 2024-01-08 NOTE — SOCIAL HISTORY
[FreeTextEntry1] :  history of cannabis use disorder. None in over a year.  current use of nicotine vaporizer, reports cutting back Drinks alcohol socially and not more than two drinks at a time.

## 2024-01-08 NOTE — DISCUSSION/SUMMARY
[FreeTextEntry1] : Shakila Pacheco is 23 years old, , English speaking, single , , domiciled with his mother, with a history of having been diagnosed with schizophrenia. He was referred to [facility] program after his first IPP in [facility] for severe mood episode with psychotic features with resolved with addition of Olanzapine . Per the intake record, there might be another depressive episode that patient is experienced few months prior to his admission to [facility] ( reported by mother- depressed, withdwarn, excessive sleep and low appetite) Patient was diagnosed with Schizophreniform disorder and now given the duration of symptoms meets criteria for schizophrenia , however, considering his family history and his clinical presentation, Bipolar 1 disorder cannot be excluded at this time. Patient demonstrated relatively good control of active psychotic and mood symptoms on olanzapine 20 mg but complained of feeling very tired during the day and expressed concerns over increased appetite and medication has been lowered to olanzapine 15mg gradually.    On evaluation today, Shakila is reporting that he feels his mood is good. He reported a good sleep pattern. He reported weight gain. He will continue to address with lifestyle management and more regular weighing. He denied thought of harming himself or others.

## 2024-01-09 DIAGNOSIS — F20.9 SCHIZOPHRENIA, UNSPECIFIED: ICD-10-CM

## 2024-01-11 ENCOUNTER — APPOINTMENT (OUTPATIENT)
Dept: PSYCHIATRY | Facility: CLINIC | Age: 24
End: 2024-01-11

## 2024-01-12 ENCOUNTER — APPOINTMENT (OUTPATIENT)
Dept: PSYCHIATRY | Facility: CLINIC | Age: 24
End: 2024-01-12

## 2024-01-12 ENCOUNTER — OUTPATIENT (OUTPATIENT)
Dept: OUTPATIENT SERVICES | Facility: HOSPITAL | Age: 24
LOS: 1 days | End: 2024-01-12

## 2024-01-12 DIAGNOSIS — F20.9 SCHIZOPHRENIA, UNSPECIFIED: ICD-10-CM

## 2024-01-13 DIAGNOSIS — F20.9 SCHIZOPHRENIA, UNSPECIFIED: ICD-10-CM

## 2024-01-18 ENCOUNTER — OUTPATIENT (OUTPATIENT)
Dept: OUTPATIENT SERVICES | Facility: HOSPITAL | Age: 24
LOS: 1 days | End: 2024-01-18
Payer: COMMERCIAL

## 2024-01-18 ENCOUNTER — APPOINTMENT (OUTPATIENT)
Dept: PSYCHIATRY | Facility: CLINIC | Age: 24
End: 2024-01-18

## 2024-01-18 DIAGNOSIS — F20.9 SCHIZOPHRENIA, UNSPECIFIED: ICD-10-CM

## 2024-01-18 PROCEDURE — 90834 PSYTX W PT 45 MINUTES: CPT

## 2024-01-19 DIAGNOSIS — F20.9 SCHIZOPHRENIA, UNSPECIFIED: ICD-10-CM

## 2024-01-19 NOTE — REASON FOR VISIT
[Patient preference] : as per patient preference [Continuing, patient not seen in-person within last 12 months (provide details below)] : Telehealth services are continuing, patient not seen in-person within last 12 months.  [Telehealth (audio & video) - Individual/Group] : This visit was provided via telehealth using real-time 2-way audio visual technology. [Medical Office: ([facility])___] : The provider was located at the medical office in [unfilled]. [Home] : The patient, [unfilled], was located at home, [unfilled], at the time of the visit. [Verbal consent obtained from patient/other participant(s)] : Verbal consent for telehealth/telephonic services obtained from patient/other participant(s) [Patient] : Patient [FreeTextEntry4] : 4072 [[facility]] : 6294 [FreeTextEntry1] : continued psychotherapy

## 2024-01-19 NOTE — END OF VISIT
[Duration of Psychotherapy Visit (minutes spent in synchronous communication): ____] : Duration of Psychotherapy Visit (minutes spent in synchronous communication): [unfilled] [Individual Psychotherapy for 38-52 minutes] : Individual Psychotherapy for 38 - 52 minutes [Teletherapy Service Provided] : The services provided in this session were delivered via tele-therapy [FreeTextEntry3] : home [FreeTextEntry5] : [address] [place] [address]

## 2024-01-19 NOTE — PLAN
[de-identified] : Met with Shakila via Tel Doc for session.  The session focused on medication management and possible transition.  Shakila inquired about medication reduction; I encouraged Shakila to speak with Dr. Gallego.  I reminded Shakila of the Shared Decision-Making Model that YRN uses when guiding treatment.  I explained that this would be a good tool to use with the doctor when discussing this matter.  Shakila expressed some concerns over how his mother would react if he was to taper down the dose of the antipsychotic. He was receptive.  Then we discussed transition.  Shakila was educated on what that would look like for him and what options we can offer.  Shakila would like to include his mother in this discussion.  Shakila will speak to mom about a time we can meet.   [Recommended Frequency of Visits: ____] : Recommended frequency of visits: [unfilled] [Return in ____ week(s)] : Return in [unfilled] week(s) [FreeTextEntry1] : Next session is 01/25/2024 at 1700.

## 2024-01-25 ENCOUNTER — OUTPATIENT (OUTPATIENT)
Dept: OUTPATIENT SERVICES | Facility: HOSPITAL | Age: 24
LOS: 1 days | End: 2024-01-25
Payer: COMMERCIAL

## 2024-01-25 ENCOUNTER — APPOINTMENT (OUTPATIENT)
Dept: PSYCHIATRY | Facility: CLINIC | Age: 24
End: 2024-01-25

## 2024-01-25 DIAGNOSIS — F20.9 SCHIZOPHRENIA, UNSPECIFIED: ICD-10-CM

## 2024-01-25 PROCEDURE — 90834 PSYTX W PT 45 MINUTES: CPT

## 2024-01-26 DIAGNOSIS — F20.9 SCHIZOPHRENIA, UNSPECIFIED: ICD-10-CM

## 2024-02-01 ENCOUNTER — OUTPATIENT (OUTPATIENT)
Dept: OUTPATIENT SERVICES | Facility: HOSPITAL | Age: 24
LOS: 1 days | End: 2024-02-01
Payer: COMMERCIAL

## 2024-02-01 ENCOUNTER — APPOINTMENT (OUTPATIENT)
Dept: PSYCHIATRY | Facility: CLINIC | Age: 24
End: 2024-02-01

## 2024-02-01 DIAGNOSIS — F20.9 SCHIZOPHRENIA, UNSPECIFIED: ICD-10-CM

## 2024-02-01 PROCEDURE — 90834 PSYTX W PT 45 MINUTES: CPT

## 2024-02-02 ENCOUNTER — APPOINTMENT (OUTPATIENT)
Dept: PSYCHIATRY | Facility: CLINIC | Age: 24
End: 2024-02-02

## 2024-02-02 ENCOUNTER — OUTPATIENT (OUTPATIENT)
Dept: OUTPATIENT SERVICES | Facility: HOSPITAL | Age: 24
LOS: 1 days | End: 2024-02-02

## 2024-02-02 DIAGNOSIS — F20.9 SCHIZOPHRENIA, UNSPECIFIED: ICD-10-CM

## 2024-02-03 DIAGNOSIS — F20.9 SCHIZOPHRENIA, UNSPECIFIED: ICD-10-CM

## 2024-02-06 ENCOUNTER — OUTPATIENT (OUTPATIENT)
Dept: OUTPATIENT SERVICES | Facility: HOSPITAL | Age: 24
LOS: 1 days | End: 2024-02-06
Payer: COMMERCIAL

## 2024-02-06 ENCOUNTER — APPOINTMENT (OUTPATIENT)
Dept: PSYCHIATRY | Facility: CLINIC | Age: 24
End: 2024-02-06
Payer: COMMERCIAL

## 2024-02-06 DIAGNOSIS — F20.9 SCHIZOPHRENIA, UNSPECIFIED: ICD-10-CM

## 2024-02-06 PROCEDURE — 99214 OFFICE O/P EST MOD 30 MIN: CPT | Mod: 95

## 2024-02-06 PROCEDURE — 99214 OFFICE O/P EST MOD 30 MIN: CPT | Mod: 95,GC

## 2024-02-06 RX ORDER — METFORMIN HYDROCHLORIDE 1000 MG/1
1000 TABLET, COATED ORAL
Qty: 180 | Refills: 0 | Status: ACTIVE | COMMUNITY
Start: 2023-09-21 | End: 1900-01-01

## 2024-02-06 NOTE — REASON FOR VISIT
[Patient preference] : as per patient preference [Continuing, patient not seen in-person within last 12 months (provide details below)] : Telehealth services are continuing, patient not seen in-person within last 12 months.  [Telehealth (audio & video) - Individual/Group] : This visit was provided via telehealth using real-time 2-way audio visual technology. [Other Location: e.g. Home (Enter Location, City,State)___] : The provider was located at [unfilled]. [Home] : The patient, [unfilled], was located at home, [unfilled], at the time of the visit. [Verbal consent obtained from patient/other participant(s)] : Verbal consent for telehealth/telephonic services obtained from patient/other participant(s) [FreeTextEntry4] : 2:00pm [FreeTextEntry5] : 3:00pm [Patient] : Patient [FreeTextEntry1] : Supportive employment and educational services.

## 2024-02-06 NOTE — PLAN
[FreeTextEntry2] :  Shakila will create meaningful academic and occupational engagements.   [Skills training (all types)] : Skills training (all types)  [Other: ____] : [unfilled] [de-identified] : Shakila continues his weekly engagement with the Supported Employment and  as he pursues his goal of establishing meaningful academic and occupational pursuits. Shakila has expressed ongoing interest in exploring various work opportunities independently. During their recent session, the Supported Employment and  inquired about any updates in Shakila's employment preferences. Shakila mentioned his openness to different options but emphasized the importance of location, seeking a comfortable environment. Together, they explored potential opportunities. The Specialist also followed up with Shakila regarding an email he planned to send to the union expressing his interest. Shakila acknowledged his previous inability to send the email but committed to doing so promptly. With guidance from the Specialist, Shakila successfully composed and sent the email, gaining clarity on the necessary steps to pursue union membership. The session concluded with Shakila accomplishing this task.  [Recommended Frequency of Visits: ____] : Recommended frequency of visits: [unfilled] [Return in ____ week(s)] : Return in [unfilled] week(s) [FreeTextEntry1] : Shakila will meet with the Supported Employment and  again on 2/7.

## 2024-02-07 ENCOUNTER — APPOINTMENT (OUTPATIENT)
Dept: PSYCHIATRY | Facility: CLINIC | Age: 24
End: 2024-02-07

## 2024-02-07 DIAGNOSIS — F20.9 SCHIZOPHRENIA, UNSPECIFIED: ICD-10-CM

## 2024-02-08 ENCOUNTER — APPOINTMENT (OUTPATIENT)
Dept: PSYCHIATRY | Facility: CLINIC | Age: 24
End: 2024-02-08

## 2024-02-09 ENCOUNTER — OUTPATIENT (OUTPATIENT)
Dept: OUTPATIENT SERVICES | Facility: HOSPITAL | Age: 24
LOS: 1 days | End: 2024-02-09
Payer: COMMERCIAL

## 2024-02-09 ENCOUNTER — APPOINTMENT (OUTPATIENT)
Dept: PSYCHIATRY | Facility: CLINIC | Age: 24
End: 2024-02-09

## 2024-02-09 DIAGNOSIS — F20.9 SCHIZOPHRENIA, UNSPECIFIED: ICD-10-CM

## 2024-02-09 PROCEDURE — 90834 PSYTX W PT 45 MINUTES: CPT

## 2024-02-10 DIAGNOSIS — F20.9 SCHIZOPHRENIA, UNSPECIFIED: ICD-10-CM

## 2024-02-12 NOTE — DISCUSSION/SUMMARY
[FreeTextEntry1] : Shakila Pacheco is 23 years old, , English speaking, single , , domiciled with his mother, with a history of having been diagnosed with schizophrenia. He was referred to [facility] program after his first IPP in [facility] for severe mood episode with psychotic features with resolved with addition of Olanzapine . Per the intake record, there might be another depressive episode that patient is experienced few months prior to his admission to [facility] ( reported by mother- depressed, withdwarn, excessive sleep and low appetite) Patient was diagnosed with Schizophreniform disorder and now given the duration of symptoms meets criteria for schizophrenia , however, considering his family history and his clinical presentation, Bipolar 1 disorder cannot be excluded at this time. Patient demonstrated relatively good control of active psychotic and mood symptoms on olanzapine 20 mg but complained of feeling very tired during the day and expressed concerns over increased appetite and medication has been lowered to olanzapine 15mg gradually.    On evaluation today, Shakila is reporting that he feels his mood is good but reported feeling tired and reported concern that he is sleeping "too much". Given a number of symptoms, it is possible that some of his symptoms are due to his current olanzapine. We discussed treatment options and after discussing risk/benefits, patient was agreeable to trial and cross taper to abilify. He denied thought of harming himself or others and remains appropriate for outpatient follow up.

## 2024-02-12 NOTE — HISTORY OF PRESENT ILLNESS
[FreeTextEntry1] : Shakila was seen for psychiatric follow up over telehealth platform.   On encounter patient reports that he is doing well overall but reports that he feels that he is sleeping "more than i should". He reports that he has not been feeling down or depressed but reports that for some reason he find it difficult to go out, in part "because i don't really feel like i need to go out". He admitted to some low energy, he is unclear whether he experiences anhedonia, reporting that he does not go out much except on weeks. He reports that his appetite is good. He denied AVH. Given the above, we discussed the possibility that his medication may contribute to some of his presentation as well as diet and activities. We discussed treatment options including possible trial of abilify. After discussing risks and benefits, patient was agreeable to trial of abilify with cross titration of olanzapine. Patient denied acute concerns at this time, denied si/hi, denied avh, was linear in thought, not manic or psychotic during encounter.  [FreeTextEntry2] : Pt [place] having thoughts of not wanting to be alive. See previous history/records.  recently admitted at EOB at [facility] : 7/19/22-7/21/22 after he presented himself to the ED for bruising. While he was in the EOB he was started on Depakene 1500mg at bedtime and Olanzapine was increased to 20mg at bedtime.

## 2024-02-12 NOTE — PLAN
[Medication education provided] : Medication education provided. [Rationale for medication choices, possible risks/precautions, benefits, alternative treatment choices, and consequences of non-treatment discussed] : Rationale for medication choices, possible risks/precautions, benefits, alternative treatment choices, and consequences of non-treatment discussed with patient/family/caregiver  [FreeTextEntry4] : Goal 1: Prevent reoccurrence of psychosis and maintain health.  Goal keyword or goal statement: identify antecedents to psychosis Assessment of progress on goal/objective: Shakila has identified that stress of dating has been a trigger to feelings of depression and isolation. He is currently involved in a relationship and he states this has been "good for me." However, there are other girls that are interested in him and this makes balancing his work and personal life difficult and stressful. 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 Shakila Ba's mother is no longer able to attend the family support meetings because she has returned to working in her office full-time. Jesenia remains involved and reaches out to the team when needed. Shakila states he is struggling with incorporating physical exercise into his life. Sahkila is no longer considering a gym membership as he has a physically demanding job. ANTIONETTE and Shakila discussed maintaining contact with the team to add to consistency of his treatment.  [FreeTextEntry5] : Continue Metformin to 1000mg BID for weight gain due to psychotropic medication. decrease Olanzapine to 10mg daily, for a history of psychotic symptoms, given weight gain and likely excessive sedation.  Start trial of aripiprazole 5mg po daily for psychotic symptoms. continue Lexapro 20mg daily, for a history of depression and anxiety Continue with [facility] therapist and SEES

## 2024-02-12 NOTE — REASON FOR VISIT
[Patient preference] : as per patient preference [Telehealth (audio & video) - Individual/Group] : This visit was provided via telehealth using real-time 2-way audio visual technology. [Medical Office: ([facility])___] : The provider was located at the medical office in [unfilled]. [Home] : The patient, [unfilled], was located at home, [unfilled], at the time of the visit. [Verbal consent obtained from patient/other participant(s)] : Verbal consent for telehealth/telephonic services obtained from patient/other participant(s) [Patient] : Patient [FreeTextEntry4] : 3:08 [FreeTextEntry5] : 3:31 [FreeTextEntry1] : "I'm OK"

## 2024-02-12 NOTE — ADDENDUM
[FreeTextEntry1] : Patient was seen and assessed with resident. Case was discussed and I agree with the above assessment, diagnosis and plan.

## 2024-02-12 NOTE — PHYSICAL EXAM
[Cooperative] : cooperative [Euthymic] : euthymic [Full] : full [Clear] : clear [Linear/Goal Directed] : linear/goal directed [Average] : average [WNL] : within normal limits [FreeTextEntry8] : "i'm good."

## 2024-02-15 ENCOUNTER — APPOINTMENT (OUTPATIENT)
Dept: PSYCHIATRY | Facility: CLINIC | Age: 24
End: 2024-02-15

## 2024-02-16 ENCOUNTER — APPOINTMENT (OUTPATIENT)
Dept: PSYCHIATRY | Facility: CLINIC | Age: 24
End: 2024-02-16

## 2024-02-20 ENCOUNTER — OUTPATIENT (OUTPATIENT)
Dept: OUTPATIENT SERVICES | Facility: HOSPITAL | Age: 24
LOS: 1 days | End: 2024-02-20

## 2024-02-20 ENCOUNTER — APPOINTMENT (OUTPATIENT)
Dept: PSYCHIATRY | Facility: CLINIC | Age: 24
End: 2024-02-20

## 2024-02-20 DIAGNOSIS — F20.9 SCHIZOPHRENIA, UNSPECIFIED: ICD-10-CM

## 2024-02-20 NOTE — REASON FOR VISIT
[Patient preference] : as per patient preference [Continuing, patient not seen in-person within last 12 months (provide details below)] : Telehealth services are continuing, patient not seen in-person within last 12 months.  [Telehealth (audio & video) - Individual/Group] : This visit was provided via telehealth using real-time 2-way audio visual technology. [Other Location: e.g. Home (Enter Location, City,State)___] : The provider was located at [unfilled]. [Home] : The patient, [unfilled], was located at home, [unfilled], at the time of the visit. [Verbal consent obtained from patient/other participant(s)] : Verbal consent for telehealth/telephonic services obtained from patient/other participant(s) [FreeTextEntry4] : 12:00pm [FreeTextEntry5] : 1:00pm [Patient] : Patient [FreeTextEntry1] : Supportive employment and educational services.

## 2024-02-20 NOTE — PLAN
[FreeTextEntry2] :  Shakila will create meaningful academic and occupational engagements.  [Skills training (all types)] : Skills training (all types)  [Other: ____] : [unfilled] [de-identified] : Dulce Maria remains actively engaged with the Supported Employment and  as he strives to establish meaningful academic and occupational pursuits. Expressing feelings of stress and frustration due to his current lack of employment, Dulce Maria shared his concerns during recent sessions. The specialist, having worked with him for some time, noted a lack of consistency in Dulce Maria's job search efforts and an apparent reluctance to fully commit to the process. Furthermore, the specialist emphasized the importance of location in Dulce Maria's job search, explaining that while she endeavors to accommodate his preferences, her expertise does not extend to [place]. Thus, she encouraged Dulce Maria to be transparent about his desired work location to facilitate the search for suitable opportunities. Despite Dulce Maria's occasional inclination to consider job opportunities anywhere, he often hesitates to pursue them or deems the locations unsuitable. Acknowledging the specialist's advice, Dulce Maria expressed a preference for employment close to his residence or in the [facility] area, with a focus on roles in retail or cleaning, steering clear of the food industry. Building upon this insight, the specialist explored potential job options tailored to Dulce Maria's preferences during their latest meeting. Although Dulce Maria initially appeared distracted by a video game at the meeting's onset, the specialist tactfully suggested rescheduling or temporarily pausing the game. However, Dulce Maria opted to remain focused on the discussion, actively engaging with the specialist and even utilizing his laptop to explore opportunities together. 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 [Recommended Frequency of Visits: ____] : Recommended frequency of visits: [unfilled] [Return in ____ week(s)] : Return in [unfilled] week(s) [FreeTextEntry1] : Shakila will meet with the Supported Employment and  again on 2/26.

## 2024-02-21 DIAGNOSIS — F20.9 SCHIZOPHRENIA, UNSPECIFIED: ICD-10-CM

## 2024-02-22 ENCOUNTER — APPOINTMENT (OUTPATIENT)
Dept: PSYCHIATRY | Facility: CLINIC | Age: 24
End: 2024-02-22

## 2024-02-22 ENCOUNTER — OUTPATIENT (OUTPATIENT)
Dept: OUTPATIENT SERVICES | Facility: HOSPITAL | Age: 24
LOS: 1 days | End: 2024-02-22
Payer: COMMERCIAL

## 2024-02-22 DIAGNOSIS — F20.9 SCHIZOPHRENIA, UNSPECIFIED: ICD-10-CM

## 2024-02-22 PROCEDURE — 90834 PSYTX W PT 45 MINUTES: CPT

## 2024-02-23 DIAGNOSIS — F20.9 SCHIZOPHRENIA, UNSPECIFIED: ICD-10-CM

## 2024-02-26 ENCOUNTER — OUTPATIENT (OUTPATIENT)
Dept: OUTPATIENT SERVICES | Facility: HOSPITAL | Age: 24
LOS: 1 days | End: 2024-02-26

## 2024-02-26 ENCOUNTER — APPOINTMENT (OUTPATIENT)
Dept: PSYCHIATRY | Facility: CLINIC | Age: 24
End: 2024-02-26

## 2024-02-26 DIAGNOSIS — F20.9 SCHIZOPHRENIA, UNSPECIFIED: ICD-10-CM

## 2024-02-27 ENCOUNTER — APPOINTMENT (OUTPATIENT)
Dept: PSYCHIATRY | Facility: CLINIC | Age: 24
End: 2024-02-27

## 2024-02-27 DIAGNOSIS — F20.9 SCHIZOPHRENIA, UNSPECIFIED: ICD-10-CM

## 2024-02-27 NOTE — DISCUSSION/SUMMARY
[FreeTextEntry1] : Patient scheduled for follow up appointment today virtually Patient did not join session. Called patient to ask to join session or reschedule. Patient picked up and asked to reschedule.

## 2024-02-28 NOTE — REASON FOR VISIT
[Patient preference] : as per patient preference [Continuing, patient not seen in-person within last 12 months (provide details below)] : Telehealth services are continuing, patient not seen in-person within last 12 months.  [Medical Office: ([facility])___] : The provider was located at the medical office in [unfilled]. [Telehealth (audio & video) - Individual/Group] : This visit was provided via telehealth using real-time 2-way audio visual technology. [Home] : The patient, [unfilled], was located at home, [unfilled], at the time of the visit. [FreeTextEntry4] : 3:00pm [Verbal consent obtained from patient/other participant(s)] : Verbal consent for telehealth/telephonic services obtained from patient/other participant(s) [FreeTextEntry5] : 4:00pm [Patient] : Patient [FreeTextEntry1] : Supportive employment and educational services.

## 2024-02-28 NOTE — END OF VISIT
[Individual Psychotherapy for 53+ minutes] : Individual Psychotherapy for 53+ minutes  [Teletherapy Service Provided] : The services provided in this session were delivered via tele-therapy [FreeTextEntry5] : [address] [place] [address]  [FreeTextEntry3] : Home

## 2024-02-28 NOTE — PLAN
[FreeTextEntry2] :  Shakila will create meaningful academic and occupational engagements.   [Skills training (all types)] : Skills training (all types)  [Other: ____] : [unfilled] [de-identified] : Shakila remains actively engaged in weekly sessions with the Supported Employment and , focusing on his goal of establishing meaningful academic and occupational pursuits. During a recent conversation with his mother, Shakila had a significant realization regarding the effort required to secure employment. He expressed feeling misunderstood by his mother, who may not fully grasp the challenges he faces in working with people and in environments where he feels uncomfortable. The Supported Employment and  noted a similar pattern of behavior in their sessions, where Shakila sometimes portrays himself as capable of working anywhere, despite his reservations. Emphasizing the importance of transparency, the Specialist encouraged Shakila to openly communicate his feelings to others. 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 The Supported Employment and  provided guidance by familiarizing Shakila with the interview location and conducting practice sessions to help him feel more confident and prepared. [Recommended Frequency of Visits: ____] : Recommended frequency of visits: [unfilled] [Return in ____ week(s)] : Return in [unfilled] week(s) [FreeTextEntry1] : Shakila will meet with the Supported Employment and  again on 3/5.

## 2024-02-29 ENCOUNTER — OUTPATIENT (OUTPATIENT)
Dept: OUTPATIENT SERVICES | Facility: HOSPITAL | Age: 24
LOS: 1 days | End: 2024-02-29
Payer: COMMERCIAL

## 2024-02-29 ENCOUNTER — APPOINTMENT (OUTPATIENT)
Dept: PSYCHIATRY | Facility: CLINIC | Age: 24
End: 2024-02-29
Payer: COMMERCIAL

## 2024-02-29 DIAGNOSIS — F20.9 SCHIZOPHRENIA, UNSPECIFIED: ICD-10-CM

## 2024-02-29 DIAGNOSIS — F25.0 SCHIZOAFFECTIVE DISORDER, BIPOLAR TYPE: ICD-10-CM

## 2024-02-29 PROCEDURE — 99214 OFFICE O/P EST MOD 30 MIN: CPT | Mod: 95

## 2024-02-29 PROCEDURE — 90832 PSYTX W PT 30 MINUTES: CPT

## 2024-03-01 DIAGNOSIS — F25.0 SCHIZOAFFECTIVE DISORDER, BIPOLAR TYPE: ICD-10-CM

## 2024-03-01 DIAGNOSIS — F20.9 SCHIZOPHRENIA, UNSPECIFIED: ICD-10-CM

## 2024-03-01 NOTE — END OF VISIT
[Duration of Psychotherapy Visit (minutes spent in synchronous communication): ____] : Duration of Psychotherapy Visit (minutes spent in synchronous communication): [unfilled] [Individual Psychotherapy for 16-37 minutes] : Individual Psychotherapy for 16-37 minutes [Teletherapy Service Provided] : The services provided in this session were delivered via tele-therapy [FreeTextEntry3] : home [FreeTextEntry5] : [address] [place] [address]

## 2024-03-01 NOTE — REASON FOR VISIT
[Patient preference] : as per patient preference [Telephone (audio) - Individual/Group] : This telephonic visit was provided via audio only technology. [Medical Office: ([facility])___] : The provider was located at the medical office in [unfilled]. [Verbal consent obtained from patient/other participant(s)] : Verbal consent for telehealth/telephonic services obtained from patient/other participant(s) [Home] : The patient, [unfilled], was located at home, [unfilled], at the time of the visit. [[facility] [FreeTextEntry4] : 2352 [FreeTextEntry1] : continued psychotherapy [Patient] : Patient

## 2024-03-01 NOTE — PHYSICAL EXAM
[Cooperative] : cooperative [Euthymic] : euthymic [Full] : full [Linear/Goal Directed] : linear/goal directed [Clear] : clear [Preoccupations/Ruminations] : preoccupations/ruminations [None Reported] : none reported [Average] : average [WNL] : within normal limits [Not applicable] : not applicable [FreeTextEntry1] : unable to assess no video  [de-identified] : unable to assess no video  [de-identified] : unable to assess no video  [FreeTextEntry7] : worried about other peers judgement of him
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
## 2024-03-05 ENCOUNTER — APPOINTMENT (OUTPATIENT)
Dept: PSYCHIATRY | Facility: CLINIC | Age: 24
End: 2024-03-05
Payer: COMMERCIAL

## 2024-03-05 ENCOUNTER — OUTPATIENT (OUTPATIENT)
Dept: OUTPATIENT SERVICES | Facility: HOSPITAL | Age: 24
LOS: 1 days | End: 2024-03-05
Payer: COMMERCIAL

## 2024-03-05 DIAGNOSIS — F20.9 SCHIZOPHRENIA, UNSPECIFIED: ICD-10-CM

## 2024-03-05 PROCEDURE — 99214 OFFICE O/P EST MOD 30 MIN: CPT | Mod: 95

## 2024-03-05 NOTE — HISTORY OF PRESENT ILLNESS
[FreeTextEntry1] : Shakila was seen for psychiatric follow up over telehealth platform.  Upon interview, Shakila reported having been able to reduce the Olanzapine and started Abilify. He stated he has been feeling "fine." He reported better amounts of energy throughout the day although reporting some continued fatigue. He denied feeling depressed or hopeless. He denied thoughts of wanting to harm himself or others. He denied experiencing symptoms of psychosis such as auditory or visual hallucinations. He denied feeling paranoid or experiencing suspiciousness. He reports looking forward to his future. He reports willingness to continue with the cross titration of the neuroleptic medications.  [FreeTextEntry2] : Pt [place] having thoughts of not wanting to be alive. See previous history/records.  recently admitted at EOB at [facility] : 7/19/22-7/21/22 after he presented himself to the ED for bruising. While he was in the EOB he was started on Depakene 1500mg at bedtime and Olanzapine was increased to 20mg at bedtime.

## 2024-03-05 NOTE — REASON FOR VISIT
[Patient preference] : as per patient preference [Telehealth (audio & video) - Individual/Group] : This visit was provided via telehealth using real-time 2-way audio visual technology. [Medical Office: ([facility])___] : The provider was located at the medical office in [unfilled]. [Home] : The patient, [unfilled], was located at home, [unfilled], at the time of the visit. [Verbal consent obtained from patient/other participant(s)] : Verbal consent for telehealth/telephonic services obtained from patient/other participant(s) [Patient] : Patient [FreeTextEntry4] : 1:30pm [FreeTextEntry5] : 2pm [FreeTextEntry1] : "I'm doing fine."

## 2024-03-05 NOTE — PHYSICAL EXAM
[Cooperative] : cooperative [Full] : full [Euthymic] : euthymic [Linear/Goal Directed] : linear/goal directed [Clear] : clear [Average] : average [WNL] : within normal limits [FreeTextEntry8] : "i'm good."

## 2024-03-05 NOTE — RISK ASSESSMENT
[No, patient denies ideation or behavior] : No, patient denies ideation or behavior [Yes] : Yes [Low acute suicide risk] : Low acute suicide risk [Not clinically indicated] : Safety Plan completed/updated (for individuals at risk): Not clinically indicated

## 2024-03-05 NOTE — PLAN
[Rationale for medication choices, possible risks/precautions, benefits, alternative treatment choices, and consequences of non-treatment discussed] : Rationale for medication choices, possible risks/precautions, benefits, alternative treatment choices, and consequences of non-treatment discussed with patient/family/caregiver  [Medication education provided] : Medication education provided. [FreeTextEntry5] : Continue Metformin to 1000mg BID for weight gain due to psychotropic medication. decrease Olanzapine 10mg to 5mg for one week then stop. Increase Abilify 5mg to Abilify 10mg daily for one week then increase to 15mg daily. continue Lexapro 20mg daily, for a history of depression and anxiety Continue with [facility] therapist and SEES [FreeTextEntry4] : Goal 1: Prevent reoccurrence of psychosis and maintain health.  Goal keyword or goal statement: identify antecedents to psychosis Assessment of progress on goal/objective: Shakila has identified that stress of dating has been a trigger to feelings of depression and isolation. He is currently involved in a relationship and he states this has been "good for me." However, there are other girls that are interested in him and this makes balancing his work and personal life difficult and stressful. 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 Shakila Ba's mother is no longer able to attend the family support meetings because she has returned to working in her office full-time. Jesenia remains involved and reaches out to the team when needed. Shakila states he is struggling with incorporating physical exercise into his life. Shakila is no longer considering a gym membership as he has a physically demanding job. ANTIONETTE and Shakila discussed maintaining contact with the team to add to consistency of his treatment.

## 2024-03-05 NOTE — DISCUSSION/SUMMARY
[FreeTextEntry1] : Shakila Pacheco is 23 years old, , English speaking, single , , domiciled with his mother, with a history of having been diagnosed with schizophrenia. He was referred to [facility] program after his first IPP in [facility] for severe mood episode with psychotic features with resolved with addition of Olanzapine . Per the intake record, there might be another depressive episode that patient is experienced few months prior to his admission to [facility] ( reported by mother- depressed, withdwarn, excessive sleep and low appetite) Patient was diagnosed with Schizophreniform disorder and now given the duration of symptoms meets criteria for schizophrenia , however, considering his family history and his clinical presentation, Bipolar 1 disorder cannot be excluded at this time. Patient demonstrated relatively good control of active psychotic and mood symptoms on olanzapine 20 mg but complained of feeling very tired during the day and expressed concerns over increased appetite and medication has been lowered to olanzapine 15mg gradually.    On evaluation today, Shakila reports his mood has been stable and euthymic. He denied symptoms consistent with psychosis. He reports improved fatigue as the Olanzapine was decreased. He denied safety concerns such as thoughts of harming himself or others. He reports willingness for and understanding of the current medication plan.

## 2024-03-06 RX ORDER — ESCITALOPRAM OXALATE 20 MG/1
20 TABLET ORAL
Qty: 90 | Refills: 0 | Status: ACTIVE | COMMUNITY
Start: 2023-04-18 | End: 1900-01-01

## 2024-03-07 ENCOUNTER — OUTPATIENT (OUTPATIENT)
Dept: OUTPATIENT SERVICES | Facility: HOSPITAL | Age: 24
LOS: 1 days | End: 2024-03-07
Payer: COMMERCIAL

## 2024-03-07 ENCOUNTER — APPOINTMENT (OUTPATIENT)
Dept: PSYCHIATRY | Facility: CLINIC | Age: 24
End: 2024-03-07

## 2024-03-07 DIAGNOSIS — F25.0 SCHIZOAFFECTIVE DISORDER, BIPOLAR TYPE: ICD-10-CM

## 2024-03-07 PROCEDURE — 90834 PSYTX W PT 45 MINUTES: CPT

## 2024-03-08 ENCOUNTER — OUTPATIENT (OUTPATIENT)
Dept: OUTPATIENT SERVICES | Facility: HOSPITAL | Age: 24
LOS: 1 days | End: 2024-03-08
Payer: COMMERCIAL

## 2024-03-08 ENCOUNTER — APPOINTMENT (OUTPATIENT)
Dept: PSYCHIATRY | Facility: CLINIC | Age: 24
End: 2024-03-08

## 2024-03-08 DIAGNOSIS — F25.0 SCHIZOAFFECTIVE DISORDER, BIPOLAR TYPE: ICD-10-CM

## 2024-03-08 PROCEDURE — 90832 PSYTX W PT 30 MINUTES: CPT

## 2024-03-11 DIAGNOSIS — F20.9 SCHIZOPHRENIA, UNSPECIFIED: ICD-10-CM

## 2024-03-12 ENCOUNTER — APPOINTMENT (OUTPATIENT)
Dept: PSYCHIATRY | Facility: CLINIC | Age: 24
End: 2024-03-12
Payer: COMMERCIAL

## 2024-03-12 ENCOUNTER — OUTPATIENT (OUTPATIENT)
Dept: OUTPATIENT SERVICES | Facility: HOSPITAL | Age: 24
LOS: 1 days | End: 2024-03-12
Payer: COMMERCIAL

## 2024-03-12 DIAGNOSIS — F12.20 CANNABIS DEPENDENCE, UNCOMPLICATED: ICD-10-CM

## 2024-03-12 DIAGNOSIS — F20.9 SCHIZOPHRENIA, UNSPECIFIED: ICD-10-CM

## 2024-03-12 PROCEDURE — 99214 OFFICE O/P EST MOD 30 MIN: CPT | Mod: 95

## 2024-03-13 DIAGNOSIS — F20.9 SCHIZOPHRENIA, UNSPECIFIED: ICD-10-CM

## 2024-03-14 ENCOUNTER — OUTPATIENT (OUTPATIENT)
Dept: OUTPATIENT SERVICES | Facility: HOSPITAL | Age: 24
LOS: 1 days | End: 2024-03-14
Payer: COMMERCIAL

## 2024-03-14 ENCOUNTER — APPOINTMENT (OUTPATIENT)
Dept: PSYCHIATRY | Facility: CLINIC | Age: 24
End: 2024-03-14

## 2024-03-14 DIAGNOSIS — F20.9 SCHIZOPHRENIA, UNSPECIFIED: ICD-10-CM

## 2024-03-14 PROCEDURE — 90834 PSYTX W PT 45 MINUTES: CPT

## 2024-03-15 DIAGNOSIS — F20.9 SCHIZOPHRENIA, UNSPECIFIED: ICD-10-CM

## 2024-03-19 ENCOUNTER — APPOINTMENT (OUTPATIENT)
Dept: PSYCHIATRY | Facility: CLINIC | Age: 24
End: 2024-03-19

## 2024-03-19 ENCOUNTER — OUTPATIENT (OUTPATIENT)
Dept: OUTPATIENT SERVICES | Facility: HOSPITAL | Age: 24
LOS: 1 days | End: 2024-03-19

## 2024-03-19 DIAGNOSIS — F20.9 SCHIZOPHRENIA, UNSPECIFIED: ICD-10-CM

## 2024-03-19 NOTE — REASON FOR VISIT
[Patient preference] : as per patient preference [Continuing, patient not seen in-person within last 12 months (provide details below)] : Telehealth services are continuing, patient not seen in-person within last 12 months.  [Other Location: e.g. Home (Enter Location, City,State)___] : The provider was located at [unfilled]. [Telehealth (audio & video) - Individual/Group] : This visit was provided via telehealth using real-time 2-way audio visual technology. [Home] : The patient, [unfilled], was located at home, [unfilled], at the time of the visit. [Verbal consent obtained from patient/other participant(s)] : Verbal consent for telehealth/telephonic services obtained from patient/other participant(s) [FreeTextEntry4] : 1:00pm [FreeTextEntry5] : 1:30pm [Patient] : Patient [FreeTextEntry1] : Supportive employment and educational services.

## 2024-03-19 NOTE — END OF VISIT
[Individual Psychotherapy for 16-37 minutes] : Individual Psychotherapy for 16-37 minutes [Teletherapy Service Provided] : The services provided in this session were delivered via tele-therapy [FreeTextEntry3] : Home [FreeTextEntry5] : Home-Remote Work

## 2024-03-19 NOTE — PLAN
[FreeTextEntry2] :  Shakila will create meaningful academic and occupational engagements.  [Other: ____] : [unfilled] [Skills training (all types)] : Skills training (all types)  [de-identified] : Shakila maintains consistent engagement with the Supported Employment and  as he strives to establish meaningful academic and occupational pursuits. He shared that he spent the weekend socializing with his cousins, venturing out to local clubs. Concurrently, Shakila continues his exploration of employment opportunities with the support of the Employment and . 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 Eager to learn more, he expressed a desire to attend an upcoming information session. Shakila granted permission for the Specialist to contact the program on his behalf, seeking further details to assess its alignment with his interests and his involvement with his cousin's company. [Recommended Frequency of Visits: ____] : Recommended frequency of visits: [unfilled] [Return in ____ week(s)] : Return in [unfilled] week(s) [FreeTextEntry1] : Shakila will meet with the Supported Employment and  again on 3/26.

## 2024-03-20 DIAGNOSIS — F20.9 SCHIZOPHRENIA, UNSPECIFIED: ICD-10-CM

## 2024-03-21 ENCOUNTER — APPOINTMENT (OUTPATIENT)
Dept: PSYCHIATRY | Facility: CLINIC | Age: 24
End: 2024-03-21

## 2024-03-21 ENCOUNTER — OUTPATIENT (OUTPATIENT)
Dept: OUTPATIENT SERVICES | Facility: HOSPITAL | Age: 24
LOS: 1 days | End: 2024-03-21
Payer: COMMERCIAL

## 2024-03-21 DIAGNOSIS — F20.9 SCHIZOPHRENIA, UNSPECIFIED: ICD-10-CM

## 2024-03-21 PROCEDURE — 90834 PSYTX W PT 45 MINUTES: CPT

## 2024-03-22 DIAGNOSIS — F20.9 SCHIZOPHRENIA, UNSPECIFIED: ICD-10-CM

## 2024-03-26 ENCOUNTER — APPOINTMENT (OUTPATIENT)
Dept: PSYCHIATRY | Facility: CLINIC | Age: 24
End: 2024-03-26

## 2024-03-26 NOTE — DISCUSSION/SUMMARY
[FreeTextEntry1] : Patient scheduled for follow up appointment today virtually Patient was a no show. Called patient to ask to join session or reschedule. No pickup, left voicemail with callback. Alternative number (home number) goes to insurance claim line

## 2024-03-28 ENCOUNTER — APPOINTMENT (OUTPATIENT)
Dept: PSYCHIATRY | Facility: CLINIC | Age: 24
End: 2024-03-28

## 2024-04-02 PROBLEM — F12.20 CANNABIS USE DISORDER, MODERATE, DEPENDENCE: Status: ACTIVE | Noted: 2021-09-30

## 2024-04-02 NOTE — REASON FOR VISIT
[Patient preference] : as per patient preference [Medical Office: ([facility])___] : The provider was located at the medical office in [unfilled]. [Telehealth (audio & video) - Individual/Group] : This visit was provided via telehealth using real-time 2-way audio visual technology. [Home] : The patient, [unfilled], was located at home, [unfilled], at the time of the visit. [Verbal consent obtained from patient/other participant(s)] : Verbal consent for telehealth/telephonic services obtained from patient/other participant(s) [FreeTextEntry4] : 3:30pm [Patient] : Patient [FreeTextEntry5] : 3:55pm [FreeTextEntry1] : "I feel a little better"

## 2024-04-02 NOTE — PLAN
[Medication education provided] : Medication education provided. [Rationale for medication choices, possible risks/precautions, benefits, alternative treatment choices, and consequences of non-treatment discussed] : Rationale for medication choices, possible risks/precautions, benefits, alternative treatment choices, and consequences of non-treatment discussed with patient/family/caregiver  [FreeTextEntry4] : Goal 1: Prevent reoccurrence of psychosis and maintain health.  Goal keyword or goal statement: identify antecedents to psychosis Assessment of progress on goal/objective: Shakila has identified that stress of dating has been a trigger to feelings of depression and isolation. He is currently involved in a relationship and he states this has been "good for me." However, there are other girls that are interested in him and this makes balancing his work and personal life difficult and stressful. 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 Shakila Ba's mother is no longer able to attend the family support meetings because she has returned to working in her office full-time. Jesenia remains involved and reaches out to the team when needed. Shakila states he is struggling with incorporating physical exercise into his life. Shakila is no longer considering a gym membership as he has a physically demanding job. ANTIONETTE and Shakila discussed maintaining contact with the team to add to consistency of his treatment.  [FreeTextEntry5] : Continue Metformin to 1000mg BID for weight gain due to psychotropic medication. Discontinue Olanzapine Continue Abilify 15mg, Rx to be sent in.  continue Lexapro 20mg daily, for a history of depression and anxiety Continue with [facility] therapist and SE[facility]

## 2024-04-02 NOTE — PLAN
[Rationale for medication choices, possible risks/precautions, benefits, alternative treatment choices, and consequences of non-treatment discussed] : Rationale for medication choices, possible risks/precautions, benefits, alternative treatment choices, and consequences of non-treatment discussed with patient/family/caregiver  [Medication education provided] : Medication education provided. [FreeTextEntry4] : Goal 1: Prevent reoccurrence of psychosis and maintain health.  Goal keyword or goal statement: identify antecedents to psychosis Assessment of progress on goal/objective: Shakila has identified that stress of dating has been a trigger to feelings of depression and isolation. He is currently involved in a relationship and he states this has been "good for me." However, there are other girls that are interested in him and this makes balancing his work and personal life difficult and stressful. 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 Shakila Ba's mother is no longer able to attend the family support meetings because she has returned to working in her office full-time. Jesenia remains involved and reaches out to the team when needed. Shakila states he is struggling with incorporating physical exercise into his life. Shakila is no longer considering a gym membership as he has a physically demanding job. ANTIONETTE and Shakila discussed maintaining contact with the team to add to consistency of his treatment.  [FreeTextEntry5] : Continue Metformin to 1000mg BID for weight gain due to psychotropic medication. Continue Abilify 15mg, Rx to be sent in.  continue Lexapro 20mg daily, for a history of depression and anxiety Continue with [facility] therapist and [facility]

## 2024-04-02 NOTE — HISTORY OF PRESENT ILLNESS
[FreeTextEntry1] : Shakila was seen for psychiatric follow up over telehealth platform.  Upon interview, Shakila reported having been able to successfully complete the cross taper and has been taking Abilify 15mg since last encounter. He denied noticing side effects. He denied experiencing psychotic symptoms such as perceptual disturbances or paranoid / suspicious thoughts. He denied sleep or appetite disturbances saying he has more energy than when he was taking the other medication. He reported looking forward to his future. He denied any safety concerns such as thoughts of harming himself or anyone else.  [FreeTextEntry3] : Olanzapine Abilify (current) Lexapro (current) [FreeTextEntry2] : Pt reports [place] having thoughts of not wanting to be alive. See previous history/records.  recently admitted at EOB at [facility] : 7/19/22-7/21/22 after he presented himself to the ED for bruising. While he was in the EOB he was started on Depakene 1500mg at bedtime and Olanzapine was increased to 20mg at bedtime.

## 2024-04-02 NOTE — REASON FOR VISIT
[Patient preference] : as per patient preference [Telehealth (audio & video) - Individual/Group] : This visit was provided via telehealth using real-time 2-way audio visual technology. [Medical Office: ([facility])___] : The provider was located at the medical office in [unfilled]. [Home] : The patient, [unfilled], was located at home, [unfilled], at the time of the visit. [Verbal consent obtained from patient/other participant(s)] : Verbal consent for telehealth/telephonic services obtained from patient/other participant(s) [Patient] : Patient [FreeTextEntry4] : 1:30pm [FreeTextEntry5] : 1:55pm [FreeTextEntry1] : "I'm good I think"

## 2024-04-02 NOTE — PHYSICAL EXAM
[Cooperative] : cooperative [Euthymic] : euthymic [Clear] : clear [Full] : full [Linear/Goal Directed] : linear/goal directed [Average] : average [WNL] : within normal limits [FreeTextEntry8] : "i'm better."

## 2024-04-02 NOTE — DISCUSSION/SUMMARY
[FreeTextEntry1] : Shakila Pacheco is 23 years old, , English speaking, single , , domiciled with his mother, with a history of having been diagnosed with schizophrenia. He was referred to [facility] program after his first IPP in [facility] for severe mood episode with psychotic features with resolved with addition of Olanzapine . Per the intake record, there might be another depressive episode that patient is experienced few months prior to his admission to [facility] ( reported by mother- depressed, withdwarn, excessive sleep and low appetite) Patient was diagnosed with Schizophreniform disorder and now given the duration of symptoms meets criteria for schizophrenia , however, considering his family history and his clinical presentation, Bipolar 1 disorder cannot be excluded at this time. Patient demonstrated relatively good control of active psychotic and mood symptoms on olanzapine 20 mg but complained of feeling very tired during the day and expressed concerns over increased appetite and medication has been lowered to olanzapine 15mg gradually.    On evaluation today, Shakila reports his mood has been stable and euthymic. He denied symptoms consistent with psychosis. He reports improved fatigue as the Olanzapine was decreased. He denied safety concerns such as thoughts of harming himself or others. He reports willingness for and understanding of the current medication plan.

## 2024-04-02 NOTE — HISTORY OF PRESENT ILLNESS
[FreeTextEntry1] : Shakila was seen for psychiatric follow up over telehealth platform.  Upon interview, Shakila reported having been able to continue the cross taper of Olanzapine and Abilify. He reports taking 10mg Abilify and 5 of olanzapine. He reports continued mood stability and denied symptoms consistent with a full depressive episode. That said, he endorses low energy (improving) which impacts his ability to go to the gym or do other activities he used to enjoy. He denied thoughts of harming himself or others. He denied any thoughts of suicide. He reports looking forward to feeling better.  [FreeTextEntry2] : Pt reports he was [place] for having thoughts of not wanting to be alive. See previous history/records.  recently admitted at EOB at [facility] : 7/19/22-7/21/22 after he presented himself to the ED for bruising. While he was in the EOB he was started on Depakene 1500mg at bedtime and Olanzapine was increased to 20mg at bedtime.

## 2024-04-02 NOTE — DISCUSSION/SUMMARY
[FreeTextEntry1] : Shakila Pacheco is 23 years old, , English speaking, single , , domiciled with his mother, with a history of having been diagnosed with schizophrenia. He was referred to [facility] program after his first IPP in [facility] for severe mood episode with psychotic features with resolved with addition of Olanzapine . Per the intake record, there might be another depressive episode that patient is experienced few months prior to his admission to [facility] ( reported by mother- depressed, withdwarn, excessive sleep and low appetite) Patient was diagnosed with Schizophreniform disorder and now given the duration of symptoms meets criteria for schizophrenia , however, considering his family history and his clinical presentation, Bipolar 1 disorder cannot be excluded at this time. Patient demonstrated relatively good control of active psychotic and mood symptoms on olanzapine 20 mg but complained of feeling very tired during the day and expressed concerns over increased appetite and medication has been lowered to olanzapine 15mg gradually.    On evaluation today, Shakila reports his mood has been stable and euthymic. He denied symptoms consistent with psychosis. He reports improved fatigue since switching his medication from Olanzapine to Abilify. He denied safety concerns such as thoughts of harming himself or others. He reports willingness for and understanding of the current medication plan.

## 2024-04-04 ENCOUNTER — APPOINTMENT (OUTPATIENT)
Dept: PSYCHIATRY | Facility: CLINIC | Age: 24
End: 2024-04-04

## 2024-04-04 ENCOUNTER — OUTPATIENT (OUTPATIENT)
Dept: OUTPATIENT SERVICES | Facility: HOSPITAL | Age: 24
LOS: 1 days | End: 2024-04-04
Payer: COMMERCIAL

## 2024-04-04 DIAGNOSIS — F20.9 SCHIZOPHRENIA, UNSPECIFIED: ICD-10-CM

## 2024-04-04 PROCEDURE — 90834 PSYTX W PT 45 MINUTES: CPT

## 2024-04-05 DIAGNOSIS — F20.9 SCHIZOPHRENIA, UNSPECIFIED: ICD-10-CM

## 2024-04-09 ENCOUNTER — OUTPATIENT (OUTPATIENT)
Dept: OUTPATIENT SERVICES | Facility: HOSPITAL | Age: 24
LOS: 1 days | End: 2024-04-09

## 2024-04-09 ENCOUNTER — APPOINTMENT (OUTPATIENT)
Dept: PSYCHIATRY | Facility: CLINIC | Age: 24
End: 2024-04-09

## 2024-04-09 DIAGNOSIS — F20.9 SCHIZOPHRENIA, UNSPECIFIED: ICD-10-CM

## 2024-04-10 ENCOUNTER — APPOINTMENT (OUTPATIENT)
Dept: PSYCHIATRY | Facility: CLINIC | Age: 24
End: 2024-04-10
Payer: COMMERCIAL

## 2024-04-10 ENCOUNTER — OUTPATIENT (OUTPATIENT)
Dept: OUTPATIENT SERVICES | Facility: HOSPITAL | Age: 24
LOS: 1 days | End: 2024-04-10
Payer: COMMERCIAL

## 2024-04-10 DIAGNOSIS — F20.9 SCHIZOPHRENIA, UNSPECIFIED: ICD-10-CM

## 2024-04-10 PROCEDURE — 99214 OFFICE O/P EST MOD 30 MIN: CPT | Mod: 95

## 2024-04-10 RX ORDER — ARIPIPRAZOLE 15 MG/1
15 TABLET ORAL
Qty: 30 | Refills: 1 | Status: ACTIVE | COMMUNITY
Start: 2024-02-06 | End: 1900-01-01

## 2024-04-10 RX ORDER — OLANZAPINE 10 MG/1
10 TABLET, FILM COATED ORAL
Qty: 15 | Refills: 0 | Status: DISCONTINUED | COMMUNITY
Start: 2021-08-17 | End: 2024-04-10

## 2024-04-10 NOTE — PLAN
[FreeTextEntry2] :  Shakila will create meaningful academic and occupational engagements.   [Skills training (all types)] : Skills training (all types)  [Other: ____] : [unfilled] [de-identified] : Shakila continues to engage weekly with the [employer] and  while working on his goal of obtaining employment. Shakila reported that he would like to look into completing an application for SSI/SSDI. The [employer] and  offered to assist him with the application if this was something that he wanted to do. Shakila feels bad about not being able to do the things he wants to do out of the fears that he has which debilitates him from pushing forward with things like working. Shakila voiced that he feels he needs to move out of the city and live somewhere else. The [employer] and  explained that no matter where he goes the fear will follow him unless he addresses it first. Shakila agreed and explained that he finds it hard to explain what he feels when he is out of his house. 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 The [employer] and  signed him up for an info session that will take place on 5/14 @ 3:30pm. The [employer] and  suggested that he put this into his calendar not to forget.  [Recommended Frequency of Visits: ____] : Recommended frequency of visits: [unfilled] [Return in ____ week(s)] : Return in [unfilled] week(s) [FreeTextEntry1] : Shakila will meet with the [employer] and  again on 4/18.

## 2024-04-10 NOTE — REASON FOR VISIT
[Patient preference] : as per patient preference [Continuing, patient not seen in-person within last 12 months (provide details below)] : Telehealth services are continuing, patient not seen in-person within last 12 months.  [Telehealth (audio & video) - Individual/Group] : This visit was provided via telehealth using real-time 2-way audio visual technology. [Other Location: e.g. Home (Enter Location, City,State)___] : The provider was located at [unfilled]. [Home] : The patient, [unfilled], was located at home, [unfilled], at the time of the visit. [Verbal consent obtained from patient/other participant(s)] : Verbal consent for telehealth/telephonic services obtained from patient/other participant(s) [FreeTextEntry4] : 1:00pm [FreeTextEntry5] : 1:40pm [Patient] : Patient [FreeTextEntry1] : Supportive employment and educational services.

## 2024-04-11 ENCOUNTER — APPOINTMENT (OUTPATIENT)
Dept: PSYCHIATRY | Facility: CLINIC | Age: 24
End: 2024-04-11

## 2024-04-11 DIAGNOSIS — F20.9 SCHIZOPHRENIA, UNSPECIFIED: ICD-10-CM

## 2024-04-11 NOTE — DISCUSSION/SUMMARY
[FreeTextEntry1] : Shakila Pacheco is 23 years old, , English speaking, single , , domiciled with his mother, with a history of having been diagnosed with schizophrenia. He was referred to [facility] program after his first IPP in [facility] for severe mood episode with psychotic features with resolved with addition of Olanzapine . Per the intake record, there might be another depressive episode that patient is experienced few months prior to his admission to [facility] ( reported by mother- depressed, withdwarn, excessive sleep and low appetite) Patient was diagnosed with Schizophreniform disorder and now given the duration of symptoms meets criteria for schizophrenia , however, considering his family history and his clinical presentation, Bipolar 1 disorder cannot be excluded at this time. Patient demonstrated relatively good control of active psychotic and mood symptoms on olanzapine 20 mg but complained of feeling very tired during the day and expressed concerns over increased appetite and medication has been lowered to olanzapine 15mg gradually.    On evaluation today, Shakila reports his mood has been stable and euthymic. He denied symptoms consistent with psychosis. He reports weight loss and better energy during the day since switching his medication from Olanzapine to Abilify. He denied safety concerns such as thoughts of harming himself or others. He reports willingness for and understanding of the current medication plan.

## 2024-04-11 NOTE — HISTORY OF PRESENT ILLNESS
[FreeTextEntry1] : Shakila was seen for psychiatric follow up over telehealth platform.  Upon interview, Shakila continues to report tolerating the switch to Abilify. He reports better energy levels throughout the day. He denies sleep or appetite disturbances. He reports eating less and having lost about 10 lbs so far. He denied symptoms of psychosis such as audio or visual hallucinations. He denied feeling paranoid or suspicious. He denied any safety concerns such as thoughts of harming himself or anyone else. He continues to remain engaged in treatment. He reports looking forward to his future.  [FreeTextEntry2] : Pt [place] for having thoughts of not wanting to be alive. See previous history/records.  recently admitted at EOB at [facility] : 7/19/22-7/21/22 after he presented himself to the ED for bruising. While he was in the EOB he was started on Depakene 1500mg at bedtime and Olanzapine was increased to 20mg at bedtime.  [FreeTextEntry3] : Olanzapine Abilify (current) Lexapro (current)

## 2024-04-11 NOTE — PLAN
[Medication education provided] : Medication education provided. [Rationale for medication choices, possible risks/precautions, benefits, alternative treatment choices, and consequences of non-treatment discussed] : Rationale for medication choices, possible risks/precautions, benefits, alternative treatment choices, and consequences of non-treatment discussed with patient/family/caregiver  [FreeTextEntry4] : Goal 1: Prevent reoccurrence of psychosis and maintain health.  Goal keyword or goal statement: identify antecedents to psychosis Assessment of progress on goal/objective: Shakila has identified that stress of dating has been a trigger to feelings of depression and isolation. He is currently involved in a relationship and he states this has been "good for me." However, there are other girls that are interested in him and this makes balancing his work and personal life difficult and stressful. 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 Shakila Ba's mother is no longer able to attend the family support meetings because she has returned to working in her office full-time. Jesenia remains involved and reaches out to the team when needed. Shakila states he is struggling with incorporating physical exercise into his life. Shakila is no longer considering a gym membership as he has a physically demanding job. ANTIONETTE and Shakila discussed maintaining contact with the team to add to consistency of his treatment.  [FreeTextEntry5] : Continue Metformin to 1000mg BID for weight gain due to psychotropic medication. Continue Abilify 15mg, Rx to be sent in.  continue Lexapro 20mg daily, for a history of depression and anxiety Continue with [facility] therapist and [facility]

## 2024-04-11 NOTE — PHYSICAL EXAM
[Cooperative] : cooperative [Euthymic] : euthymic [Full] : full [Clear] : clear [Linear/Goal Directed] : linear/goal directed [Average] : average [WNL] : within normal limits [FreeTextEntry8] : "pretty good. done

## 2024-04-11 NOTE — REASON FOR VISIT
[Patient preference] : as per patient preference [Telehealth (audio & video) - Individual/Group] : This visit was provided via telehealth using real-time 2-way audio visual technology. [Medical Office: ([facility])___] : The provider was located at the medical office in [unfilled]. [Home] : The patient, [unfilled], was located at home, [unfilled], at the time of the visit. [Verbal consent obtained from patient/other participant(s)] : Verbal consent for telehealth/telephonic services obtained from patient/other participant(s) [FreeTextEntry4] : 12:00pm [FreeTextEntry5] : 12:25pm [Patient] : Patient [FreeTextEntry1] : "I'm feeling pretty good."

## 2024-04-18 ENCOUNTER — APPOINTMENT (OUTPATIENT)
Dept: PSYCHIATRY | Facility: CLINIC | Age: 24
End: 2024-04-18

## 2024-04-22 ENCOUNTER — APPOINTMENT (OUTPATIENT)
Dept: PSYCHIATRY | Facility: CLINIC | Age: 24
End: 2024-04-22

## 2024-04-22 ENCOUNTER — OUTPATIENT (OUTPATIENT)
Dept: OUTPATIENT SERVICES | Facility: HOSPITAL | Age: 24
LOS: 1 days | End: 2024-04-22
Payer: COMMERCIAL

## 2024-04-22 DIAGNOSIS — F25.0 SCHIZOAFFECTIVE DISORDER, BIPOLAR TYPE: ICD-10-CM

## 2024-04-22 PROCEDURE — 90834 PSYTX W PT 45 MINUTES: CPT

## 2024-04-23 ENCOUNTER — OUTPATIENT (OUTPATIENT)
Dept: OUTPATIENT SERVICES | Facility: HOSPITAL | Age: 24
LOS: 1 days | End: 2024-04-23

## 2024-04-23 ENCOUNTER — APPOINTMENT (OUTPATIENT)
Dept: PSYCHIATRY | Facility: CLINIC | Age: 24
End: 2024-04-23

## 2024-04-23 DIAGNOSIS — F25.0 SCHIZOAFFECTIVE DISORDER, BIPOLAR TYPE: ICD-10-CM

## 2024-04-23 DIAGNOSIS — F20.9 SCHIZOPHRENIA, UNSPECIFIED: ICD-10-CM

## 2024-04-24 DIAGNOSIS — F20.9 SCHIZOPHRENIA, UNSPECIFIED: ICD-10-CM

## 2024-04-25 NOTE — PLAN
[Recommended Frequency of Visits: ____] : Recommended frequency of visits: [unfilled] [Return in ____ week(s)] : Return in [unfilled] week(s) [FreeTextEntry2] : Shakila i working toward identifying barriers to his success.  [Supportive Therapy] : Supportive Therapy [de-identified] : Met with Shakila via Tel Doc for session.  The session focused on medication management and possible transition.  Shakila inquired about medication reduction; I encouraged Shakila to speak with Dr. Gallego.  I reminded Shakila of the Shared Decision-Making Model that YRN uses when guiding treatment.  I explained that this would be a good tool to use with the doctor when discussing this matter.  Shakila expressed some concerns over how his mother would react if he was to taper down the dose of the antipsychotic. He was receptive.  Then we discussed transition.  Shakila was educated on what that would look like for him and what options we can offer.  Shakila would like to include his mother in this discussion.  Shakila will speak to mom about a time we can meet.   [FreeTextEntry1] : The next session is 04/29/2024 at 12 noon.

## 2024-04-25 NOTE — PHYSICAL EXAM
[Cooperative] : cooperative [Euthymic] : euthymic [Full] : full [Clear] : clear [Linear/Goal Directed] : linear/goal directed [None Reported] : none reported [Average] : average [WNL] : within normal limits [Not applicable] : not applicable [FreeTextEntry8] : "I'm feeling Okay." [FreeTextEntry7] : Shakila reports thoughts

## 2024-04-25 NOTE — REASON FOR VISIT
[Patient preference] : as per patient preference [Continuing, patient not seen in-person within last 12 months (provide details below)] : Telehealth services are continuing, patient not seen in-person within last 12 months.  [Telehealth (audio & video) - Individual/Group] : This visit was provided via telehealth using real-time 2-way audio visual technology. [Medical Office: ([facility])___] : The provider was located at the medical office in [unfilled]. [Home] : The patient, [unfilled], was located at home, [unfilled], at the time of the visit. [Verbal consent obtained from patient/other participant(s)] : Verbal consent for telehealth/telephonic services obtained from patient/other participant(s) [Patient] : Patient [FreeTextEntry4] : 1200 [FreeTextEntry5] : 1529 [FreeTextEntry1] : continued psychotherapy

## 2024-04-25 NOTE — END OF VISIT
[Duration of Psychotherapy Visit (minutes spent in synchronous communication): ____] : Duration of Psychotherapy Visit (minutes spent in synchronous communication): [unfilled] [Individual Psychotherapy for 38-52 minutes] : Individual Psychotherapy for 38 - 52 minutes [FreeTextEntry3] : home [FreeTextEntry5] : remote telehealth hub

## 2024-04-26 NOTE — REASON FOR VISIT
[Patient preference] : as per patient preference [Continuing, patient not seen in-person within last 12 months (provide details below)] : Telehealth services are continuing, patient not seen in-person within last 12 months.  [Telehealth (audio & video) - Individual/Group] : This visit was provided via telehealth using real-time 2-way audio visual technology. [Other Location: e.g. Home (Enter Location, City,State)___] : The patient, [unfilled], was located at [unfilled] at the time of the visit. [Verbal consent obtained from patient/other participant(s)] : Verbal consent for telehealth/telephonic services obtained from patient/other participant(s) [FreeTextEntry4] : 4:10pm [FreeTextEntry5] : 4:30pm [Patient] : Patient [FreeTextEntry1] : Supportive employment and educational services.

## 2024-04-26 NOTE — END OF VISIT
[Individual Psychotherapy for 16-37 minutes] : Individual Psychotherapy for 16-37 minutes [Teletherapy Service Provided] : The services provided in this session were delivered via tele-therapy [FreeTextEntry3] : [facility] [FreeTextEntry5] : Home-Remote Work

## 2024-04-26 NOTE — PLAN
[FreeTextEntry2] :  Shakila will create meaningful academic and occupational engagements.  [Skills training (all types)] : Skills training (all types)  [Other: ____] : [unfilled] [de-identified] : Shakila continues to engage weekly with the [employer] and  while working on his goal which is to create meaningful academic and occupational engagements. Shakila was at the gym when he was meeting with the [employer] and . Shakila mentioned that he was trying to add more exercise and getting out of his apartment to his daily routine. 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 [employer] and  explained the program and mentioned that she would send him the information. [employer] and  explained that when the day for the session gets closer that he will be receiving a zoom link to join.  [Recommended Frequency of Visits: ____] : Recommended frequency of visits: [unfilled] [Return in ____ week(s)] : Return in [unfilled] week(s) [FreeTextEntry1] : Shakila will meet with the [employer] and  again on 4/30.

## 2024-04-29 ENCOUNTER — APPOINTMENT (OUTPATIENT)
Dept: PSYCHIATRY | Facility: CLINIC | Age: 24
End: 2024-04-29

## 2024-04-30 ENCOUNTER — OUTPATIENT (OUTPATIENT)
Dept: OUTPATIENT SERVICES | Facility: HOSPITAL | Age: 24
LOS: 1 days | End: 2024-04-30
Payer: COMMERCIAL

## 2024-04-30 ENCOUNTER — APPOINTMENT (OUTPATIENT)
Dept: PSYCHIATRY | Facility: CLINIC | Age: 24
End: 2024-04-30
Payer: COMMERCIAL

## 2024-04-30 DIAGNOSIS — F20.9 SCHIZOPHRENIA, UNSPECIFIED: ICD-10-CM

## 2024-04-30 PROCEDURE — 99214 OFFICE O/P EST MOD 30 MIN: CPT | Mod: 95,GC

## 2024-04-30 PROCEDURE — 99214 OFFICE O/P EST MOD 30 MIN: CPT | Mod: 95

## 2024-05-01 DIAGNOSIS — F20.9 SCHIZOPHRENIA, UNSPECIFIED: ICD-10-CM

## 2024-05-02 ENCOUNTER — APPOINTMENT (OUTPATIENT)
Dept: PSYCHIATRY | Facility: CLINIC | Age: 24
End: 2024-05-02

## 2024-05-02 NOTE — REASON FOR VISIT
[Patient preference] : as per patient preference [Telehealth (audio & video) - Individual/Group] : This visit was provided via telehealth using real-time 2-way audio visual technology. [Medical Office: ([facility])___] : The provider was located at the medical office in [unfilled]. [Home] : The patient, [unfilled], was located at home, [unfilled], at the time of the visit. [Verbal consent obtained from patient/other participant(s)] : Verbal consent for telehealth/telephonic services obtained from patient/other participant(s) [Patient] : Patient [FreeTextEntry4] : 12:00pm [FreeTextEntry5] : 12:25pm [FreeTextEntry1] : "I'm feeling pretty good."

## 2024-05-02 NOTE — PHYSICAL EXAM
[Cooperative] : cooperative [Euthymic] : euthymic [Full] : full [Clear] : clear [Linear/Goal Directed] : linear/goal directed [Average] : average [WNL] : within normal limits [FreeTextEntry8] : "pretty good"
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
## 2024-05-02 NOTE — DISCUSSION/SUMMARY
[FreeTextEntry1] : Shakila Pacheco is 23 years old, , English speaking, single , , domiciled with his mother, with a history of having been diagnosed with schizophrenia. He was referred to [facility] program after his first IPP in [facility] for severe mood episode with psychotic features with resolved with addition of Olanzapine . Per the intake record, there might be another depressive episode that patient is experienced few months prior to his admission to [facility] ( reported by mother- depressed, withdwarn, excessive sleep and low appetite) Patient was diagnosed with Schizophreniform disorder and now given the duration of symptoms meets criteria for schizophrenia , however, considering his family history and his clinical presentation, Bipolar 1 disorder cannot be excluded at this time. Patient demonstrated relatively good control of active psychotic and mood symptoms on olanzapine 20 mg but complained of feeling very tired during the day and expressed concerns over increased appetite and medication has been lowered to olanzapine 15mg gradually.    On evaluation today, Shakila reports his mood has been stable and euthymic. He denied symptoms consistent with psychosis. He reports weight loss and better energy during the day since switching his medication from Olanzapine to Abilify. He denied safety concerns such as thoughts of harming himself or others. He reports willingness for and understanding of the current medication plan.

## 2024-05-06 ENCOUNTER — APPOINTMENT (OUTPATIENT)
Dept: PSYCHIATRY | Facility: CLINIC | Age: 24
End: 2024-05-06

## 2024-05-07 ENCOUNTER — OUTPATIENT (OUTPATIENT)
Dept: OUTPATIENT SERVICES | Facility: HOSPITAL | Age: 24
LOS: 1 days | End: 2024-05-07

## 2024-05-07 ENCOUNTER — APPOINTMENT (OUTPATIENT)
Dept: PSYCHIATRY | Facility: CLINIC | Age: 24
End: 2024-05-07

## 2024-05-07 DIAGNOSIS — F20.9 SCHIZOPHRENIA, UNSPECIFIED: ICD-10-CM

## 2024-05-08 DIAGNOSIS — F20.9 SCHIZOPHRENIA, UNSPECIFIED: ICD-10-CM

## 2024-05-09 ENCOUNTER — APPOINTMENT (OUTPATIENT)
Dept: PSYCHIATRY | Facility: CLINIC | Age: 24
End: 2024-05-09

## 2024-05-10 NOTE — PLAN
[FreeTextEntry2] :  Shakila will create meaningful academic and occupational engagements.  [Skills training (all types)] : Skills training (all types)  [Other: ____] : [unfilled] [de-identified] : Shakila continues to engage with the [employer] and  while working on his goal which is to create meaningful academic and occupational engagements. Shakila apologized for missing last appointment and mentioned that his sleep was off because he has been up late recently watching basketball games on television. 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 [employer] and  explained that he should look through his email and try to find whether they had sent him a link. Shakila was able to find the link and was encouraged to put this date in his calendar to not forget to attend. Shakila mentioned that on his free time he was going to the gym. Shakila reported that he will be going on vacation this summer and whether he gets a job or goes to this program he would need some time off. [employer] and  explained that this is something that he might have to weigh out his options for and realize that this might be more of a priority rather than a vacation. [employer] and  brought up that this is a similar pattern to every other summer when he finally lands a job and needs to take weeks off for vacations. [Recommended Frequency of Visits: ____] : Recommended frequency of visits: [unfilled] [Return in ____ week(s)] : Return in [unfilled] week(s) [FreeTextEntry1] : Shakila will meet with the [employer] and  again on 5/15.

## 2024-05-10 NOTE — REASON FOR VISIT
[Patient preference] : as per patient preference [Continuing, patient not seen in-person within last 12 months (provide details below)] : Telehealth services are continuing, patient not seen in-person within last 12 months.  [Telehealth (audio & video) - Individual/Group] : This visit was provided via telehealth using real-time 2-way audio visual technology. [Other Location: e.g. Home (Enter Location, City,State)___] : The provider was located at [unfilled]. [Home] : The patient, [unfilled], was located at home, [unfilled], at the time of the visit. [Verbal consent obtained from patient/other participant(s)] : Verbal consent for telehealth/telephonic services obtained from patient/other participant(s) [FreeTextEntry4] : 1:00pm [FreeTextEntry5] : 1:38pm [Patient] : Patient [FreeTextEntry1] : Supportive employment and educational services.

## 2024-05-15 ENCOUNTER — APPOINTMENT (OUTPATIENT)
Dept: PSYCHIATRY | Facility: CLINIC | Age: 24
End: 2024-05-15

## 2024-05-15 ENCOUNTER — OUTPATIENT (OUTPATIENT)
Dept: OUTPATIENT SERVICES | Facility: HOSPITAL | Age: 24
LOS: 1 days | End: 2024-05-15
Payer: COMMERCIAL

## 2024-05-15 ENCOUNTER — OUTPATIENT (OUTPATIENT)
Dept: OUTPATIENT SERVICES | Facility: HOSPITAL | Age: 24
LOS: 1 days | End: 2024-05-15

## 2024-05-15 DIAGNOSIS — F20.9 SCHIZOPHRENIA, UNSPECIFIED: ICD-10-CM

## 2024-05-15 PROCEDURE — 90832 PSYTX W PT 30 MINUTES: CPT

## 2024-05-16 DIAGNOSIS — F20.9 SCHIZOPHRENIA, UNSPECIFIED: ICD-10-CM

## 2024-05-20 NOTE — REASON FOR VISIT
[Patient preference] : as per patient preference [Continuing, patient not seen in-person within last 12 months (provide details below)] : Telehealth services are continuing, patient not seen in-person within last 12 months.  [Telehealth (audio & video) - Individual/Group] : This visit was provided via telehealth using real-time 2-way audio visual technology. [Medical Office: ([facility])___] : The provider was located at the medical office in [unfilled]. [Home] : The patient, [unfilled], was located at home, [unfilled], at the time of the visit. [Verbal consent obtained from patient/other participant(s)] : Verbal consent for telehealth/telephonic services obtained from patient/other participant(s) [FreeTextEntry4] : 2:30pm [FreeTextEntry5] : 3:00pm [Patient] : Patient [FreeTextEntry1] : Supportive employment and educational services.

## 2024-05-20 NOTE — PLAN
[FreeTextEntry2] :  Shakila will create meaningful academic and occupational engagements.   [Skills training (all types)] : Skills training (all types)  [Other: ____] : [unfilled] [de-identified] : Shakila continues to engage weekly with the [employer] and  while working on his goal which is to create meaningful academic and occupational engagements. 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 Shakila reported that there was an application process that he needed to complete and needed some assistance with. Shakila worked with the [employer] and  on completing the application and was able to submit it. [employer] and  continues to stress the importance of checking his emails to be afloat on what is being sent to him that was important. Shakila will update the [employer] and  if he obtains any further correspondence.   [Recommended Frequency of Visits: ____] : Recommended frequency of visits: [unfilled] [Return in ____ week(s)] : Return in [unfilled] week(s) [FreeTextEntry1] : Shakila will meet with the [employer] and  again on 5/21.

## 2024-05-20 NOTE — END OF VISIT
[Individual Psychotherapy for 16-37 minutes] : Individual Psychotherapy for 16-37 minutes [Teletherapy Service Provided] : The services provided in this session were delivered via tele-therapy [FreeTextEntry3] : Home [FreeTextEntry5] : [address] [place] [address]

## 2024-05-21 ENCOUNTER — OUTPATIENT (OUTPATIENT)
Dept: OUTPATIENT SERVICES | Facility: HOSPITAL | Age: 24
LOS: 1 days | End: 2024-05-21

## 2024-05-21 ENCOUNTER — APPOINTMENT (OUTPATIENT)
Dept: PSYCHIATRY | Facility: CLINIC | Age: 24
End: 2024-05-21

## 2024-05-21 DIAGNOSIS — F20.9 SCHIZOPHRENIA, UNSPECIFIED: ICD-10-CM

## 2024-05-22 DIAGNOSIS — F20.9 SCHIZOPHRENIA, UNSPECIFIED: ICD-10-CM

## 2024-05-23 NOTE — PLAN
[FreeTextEntry2] :  Shakila will create meaningful academic and occupational engagements.   [Skills training (all types)] : Skills training (all types)  [Other: ____] : [unfilled] [de-identified] : Shakila continues to engage weekly with [employer] and  while working on his goal which is to create meaningful academic and occupational engagements.  Shakila reported that he has been searching for employment while waiting for the [facility]. Shakila mentioned that he plans on working for a few months while he waits for the approval of being accepted into the program which starts in September. [employer] and  suggested that he look into summer temporary opportunities. Shakila mentioned that he would like to just apply for anything and will hand in his two weeks when he has to quit. [employer] and  emphasize the importance of not quitting because it will reflect on his work history for the future. [employer] and  assisted Shakila with exploring some options that may work for him and even was able to apply to two opportunities that were temporary and something he expressed interest in. Shakila will continue to keep [employer] and  in the loop about any updates with his program acceptance and will continue to work with her around exploring work options.  [Recommended Frequency of Visits: ____] : Recommended frequency of visits: [unfilled] [Return in ____ week(s)] : Return in [unfilled] week(s) [FreeTextEntry1] : Shakila will meet with the [employer] and  again on 5/28.

## 2024-05-24 ENCOUNTER — OUTPATIENT (OUTPATIENT)
Dept: OUTPATIENT SERVICES | Facility: HOSPITAL | Age: 24
LOS: 1 days | End: 2024-05-24

## 2024-05-24 ENCOUNTER — APPOINTMENT (OUTPATIENT)
Dept: PSYCHIATRY | Facility: CLINIC | Age: 24
End: 2024-05-24

## 2024-05-24 DIAGNOSIS — F25.1 SCHIZOAFFECTIVE DISORDER, DEPRESSIVE TYPE: ICD-10-CM

## 2024-05-24 DIAGNOSIS — F20.9 SCHIZOPHRENIA, UNSPECIFIED: ICD-10-CM

## 2024-05-25 DIAGNOSIS — F20.9 SCHIZOPHRENIA, UNSPECIFIED: ICD-10-CM

## 2024-05-25 DIAGNOSIS — F25.1 SCHIZOAFFECTIVE DISORDER, DEPRESSIVE TYPE: ICD-10-CM

## 2024-05-28 ENCOUNTER — APPOINTMENT (OUTPATIENT)
Dept: PSYCHIATRY | Facility: CLINIC | Age: 24
End: 2024-05-28

## 2024-05-28 ENCOUNTER — OUTPATIENT (OUTPATIENT)
Dept: OUTPATIENT SERVICES | Facility: HOSPITAL | Age: 24
LOS: 1 days | End: 2024-05-28

## 2024-05-28 ENCOUNTER — APPOINTMENT (OUTPATIENT)
Dept: PSYCHIATRY | Facility: CLINIC | Age: 24
End: 2024-05-28
Payer: COMMERCIAL

## 2024-05-28 ENCOUNTER — OUTPATIENT (OUTPATIENT)
Dept: OUTPATIENT SERVICES | Facility: HOSPITAL | Age: 24
LOS: 1 days | End: 2024-05-28
Payer: COMMERCIAL

## 2024-05-28 DIAGNOSIS — F33.1 MAJOR DEPRESSIVE DISORDER, RECURRENT, MODERATE: ICD-10-CM

## 2024-05-28 DIAGNOSIS — F20.9 SCHIZOPHRENIA, UNSPECIFIED: ICD-10-CM

## 2024-05-28 PROCEDURE — 99214 OFFICE O/P EST MOD 30 MIN: CPT | Mod: 95,GC

## 2024-05-28 PROCEDURE — 99214 OFFICE O/P EST MOD 30 MIN: CPT | Mod: 95

## 2024-05-28 NOTE — PLAN
[Medication education provided] : Medication education provided. [Rationale for medication choices, possible risks/precautions, benefits, alternative treatment choices, and consequences of non-treatment discussed] : Rationale for medication choices, possible risks/precautions, benefits, alternative treatment choices, and consequences of non-treatment discussed with patient/family/caregiver  [FreeTextEntry4] : Goal 1: Prevent reoccurrence of psychosis and maintain health.  Goal keyword or goal statement: identify antecedents to psychosis Assessment of progress on goal/objective: Shakila has identified that stress of dating has been a trigger to feelings of depression and isolation. He is currently involved in a relationship and he states this has been "good for me." However, there are other girls that are interested in him and this makes balancing his work and personal life difficult and stressful. 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 Shakila Ba's mother is no longer able to attend the family support meetings because she has returned to working in her office full-time. Jesenia remains involved and reaches out to the team when needed. Shakila states he is struggling with incorporating physical exercise into his life. Sahkila is no longer considering a gym membership as he has a physically demanding job. ANTIONETTE and Shakila discussed maintaining contact with the team to add to consistency of his treatment.  [FreeTextEntry5] : Continue Metformin to 1000mg BID for weight gain due to psychotropic medication. Continue Abilify 15mg continue Lexapro 20mg daily, for a history of depression and anxiety Continue with [facility] therapist and [facility]

## 2024-05-28 NOTE — PLAN
[FreeTextEntry2] :  Shakila will create meaningful academic and occupational engagements.  [Skills training (all types)] : Skills training (all types)  [Other: ____] : [unfilled] [de-identified] : Shakila continues to engage weekly with the [employer] and  while working on his goal which is to create meaningful academic and occupational engagements. Shakila reported that he continues to look for available employment opportunities. [employer] and  questioned whether he received any calls or messages from employers he applied to during last meeting with the [employer] and . Shakila mentioned he did not receive any correspondence. [employer] and  suggested that he log into his indeed account to check in there as well, upon logging in he discovered that one of the employers reached out to him to schedule an interview for an event coming up this weekend. Shakila was able to reply back and schedule an interview for 5pm later this evening. [employer] and  spent the remainder of the meeting exploring opportunities and also going over some practice interview questions so that he feels ready for his interview later today.   [Recommended Frequency of Visits: ____] : Recommended frequency of visits: [unfilled] [Return in ____ week(s)] : Return in [unfilled] week(s) [FreeTextEntry1] : Shakila will meet with the [employer] and  again on 6/4.

## 2024-05-28 NOTE — REASON FOR VISIT
[Patient preference] : as per patient preference [Telehealth (audio & video) - Individual/Group] : This visit was provided via telehealth using real-time 2-way audio visual technology. [Medical Office: ([facility])___] : The provider was located at the medical office in [unfilled]. [Home] : The patient, [unfilled], was located at home, [unfilled], at the time of the visit. [Verbal consent obtained from patient/other participant(s)] : Verbal consent for telehealth/telephonic services obtained from patient/other participant(s) [Patient] : Patient [FreeTextEntry4] : 12:00pm [FreeTextEntry5] : 12:25pm [FreeTextEntry1] : "I'm feeling pretty good."

## 2024-05-28 NOTE — REASON FOR VISIT
[Patient preference] : as per patient preference [Continuing, patient not seen in-person within last 12 months (provide details below)] : Telehealth services are continuing, patient not seen in-person within last 12 months.  [Telehealth (audio & video) - Individual/Group] : This visit was provided via telehealth using real-time 2-way audio visual technology. [Other Location: e.g. Home (Enter Location, City,State)___] : The provider was located at [unfilled]. [Home] : The patient, [unfilled], was located at home, [unfilled], at the time of the visit. [Verbal consent obtained from patient/other participant(s)] : Verbal consent for telehealth/telephonic services obtained from patient/other participant(s) [FreeTextEntry4] : 1:00pm [FreeTextEntry5] : 1:45pm [Patient] : Patient [FreeTextEntry1] : Supportive employment and educational services.

## 2024-05-28 NOTE — DISCUSSION/SUMMARY
[FreeTextEntry1] : Shakila Pacheco is 24 years old, , English speaking, single , , domiciled with his mother, with a history of having been diagnosed with schizophrenia. He was referred to [facility] program after his first IPP in [facility] for severe mood episode with psychotic features with resolved with addition of Olanzapine . Per the intake record, there might be another depressive episode that patient is experienced few months prior to his admission to [facility] ( reported by mother- depressed, withdwarn, excessive sleep and low appetite) Patient was diagnosed with Schizophreniform disorder and now given the duration of symptoms meets criteria for schizophrenia , however, considering his family history and his clinical presentation, Bipolar 1 disorder cannot be excluded at this time. Patient demonstrated relatively good control of active psychotic and mood symptoms on olanzapine 20 mg but complained of feeling very tired during the day and expressed concerns over increased appetite and medication has been lowered to olanzapine 15mg gradually.    On evaluation today, Shakila reports his mood has been stable and euthymic. He denied symptoms consistent with psychosis. He reports weight loss and better energy during the day since switching his medication from Olanzapine to Abilify. He denied safety concerns such as thoughts of harming himself or others. He reports willingness for and understanding of the current medication plan.

## 2024-05-28 NOTE — HISTORY OF PRESENT ILLNESS
[FreeTextEntry1] : Shakila was seen for psychiatric follow up over telehealth platform.   On interview, patient reports that he continues to do well with abilify, reporting that he has found himself to be active, going out with friends and family, trying to stay social. He reports that he finds the change in medicine to be good, reporting that he would previously spend much time alone. Patient reports that altought he continues to have intermittent fears that people wish to start fights with him (see previous note), he reports that he tries to challenge this thought as letting it persist would limit his ability to enjoy life. He reports that he otherwise does not have a fear that others are out to get him or harm him, he denied avh. He reports his mood is good, reports that he is sleeping well, eating well, continues to go to the gym and has lost an additional 5 lbs since starting his weight loss journey. He does admit that on the weekends he sometimes skips his medication as he knows he will drink alcohol at night and reports that he does not wish to have the medication interact with alcohol. Patient encouraged to continue medication and Education provided. Patient expressed understanding. Patient denied acute concerns at this time, denied si/hi, denied avh, was linear in thought, not manic or psychotic during encounter.  [FreeTextEntry2] : Pt reports [place] for having thoughts of not wanting to be alive. See previous history/records.  recently admitted at EOB at [facility] : 7/19/22-7/21/22 after he presented himself to the ED for bruising. While he was in the EOB he was started on Depakene 1500mg at bedtime and Olanzapine was increased to 20mg at bedtime.  [FreeTextEntry3] : Olanzapine Abilify (current) Lexapro (current)

## 2024-05-29 DIAGNOSIS — F20.9 SCHIZOPHRENIA, UNSPECIFIED: ICD-10-CM

## 2024-06-04 ENCOUNTER — APPOINTMENT (OUTPATIENT)
Dept: PSYCHIATRY | Facility: CLINIC | Age: 24
End: 2024-06-04

## 2024-06-07 ENCOUNTER — APPOINTMENT (OUTPATIENT)
Dept: PSYCHIATRY | Facility: CLINIC | Age: 24
End: 2024-06-07

## 2024-06-07 ENCOUNTER — OUTPATIENT (OUTPATIENT)
Dept: OUTPATIENT SERVICES | Facility: HOSPITAL | Age: 24
LOS: 1 days | End: 2024-06-07

## 2024-06-07 DIAGNOSIS — F20.9 SCHIZOPHRENIA, UNSPECIFIED: ICD-10-CM

## 2024-06-08 DIAGNOSIS — F20.9 SCHIZOPHRENIA, UNSPECIFIED: ICD-10-CM

## 2024-06-10 ENCOUNTER — APPOINTMENT (OUTPATIENT)
Dept: PSYCHIATRY | Facility: CLINIC | Age: 24
End: 2024-06-10

## 2024-06-12 ENCOUNTER — OUTPATIENT (OUTPATIENT)
Dept: OUTPATIENT SERVICES | Facility: HOSPITAL | Age: 24
LOS: 1 days | End: 2024-06-12

## 2024-06-12 ENCOUNTER — APPOINTMENT (OUTPATIENT)
Dept: PSYCHIATRY | Facility: CLINIC | Age: 24
End: 2024-06-12

## 2024-06-12 DIAGNOSIS — F20.9 SCHIZOPHRENIA, UNSPECIFIED: ICD-10-CM

## 2024-06-13 ENCOUNTER — OUTPATIENT (OUTPATIENT)
Dept: OUTPATIENT SERVICES | Facility: HOSPITAL | Age: 24
LOS: 1 days | End: 2024-06-13

## 2024-06-13 ENCOUNTER — APPOINTMENT (OUTPATIENT)
Dept: PSYCHIATRY | Facility: CLINIC | Age: 24
End: 2024-06-13

## 2024-06-13 DIAGNOSIS — F20.9 SCHIZOPHRENIA, UNSPECIFIED: ICD-10-CM

## 2024-06-14 DIAGNOSIS — F20.9 SCHIZOPHRENIA, UNSPECIFIED: ICD-10-CM

## 2024-06-18 ENCOUNTER — APPOINTMENT (OUTPATIENT)
Dept: PSYCHIATRY | Facility: CLINIC | Age: 24
End: 2024-06-18

## 2024-06-18 ENCOUNTER — OUTPATIENT (OUTPATIENT)
Dept: OUTPATIENT SERVICES | Facility: HOSPITAL | Age: 24
LOS: 1 days | End: 2024-06-18

## 2024-06-18 DIAGNOSIS — F20.9 SCHIZOPHRENIA, UNSPECIFIED: ICD-10-CM

## 2024-06-18 LAB — PROLACTIN SERPL-MCNC: 1 NG/ML

## 2024-06-19 DIAGNOSIS — F20.9 SCHIZOPHRENIA, UNSPECIFIED: ICD-10-CM

## 2024-06-19 LAB
ALBUMIN SERPL ELPH-MCNC: 4.9 G/DL
ALP BLD-CCNC: 138 U/L
ALT SERPL-CCNC: 38 U/L
ANION GAP SERPL CALC-SCNC: 12 MMOL/L
AST SERPL-CCNC: 22 U/L
BASOPHILS # BLD AUTO: 0.04 K/UL
BASOPHILS NFR BLD AUTO: 0.8 %
BILIRUB SERPL-MCNC: 0.5 MG/DL
BUN SERPL-MCNC: 8 MG/DL
CALCIUM SERPL-MCNC: 9.9 MG/DL
CHLORIDE SERPL-SCNC: 104 MMOL/L
CHOLEST SERPL-MCNC: 149 MG/DL
CO2 SERPL-SCNC: 24 MMOL/L
CREAT SERPL-MCNC: 0.93 MG/DL
EGFR: 119 ML/MIN/1.73M2
EOSINOPHIL # BLD AUTO: 0.19 K/UL
EOSINOPHIL NFR BLD AUTO: 3.7 %
ESTIMATED AVERAGE GLUCOSE: 111 MG/DL
GLUCOSE SERPL-MCNC: 97 MG/DL
HBA1C MFR BLD HPLC: 5.5 %
HCT VFR BLD CALC: 48.7 %
HDLC SERPL-MCNC: 28 MG/DL
HGB BLD-MCNC: 16.3 G/DL
IMM GRANULOCYTES NFR BLD AUTO: 0.4 %
LDLC SERPL CALC-MCNC: 97 MG/DL
LYMPHOCYTES # BLD AUTO: 1.63 K/UL
LYMPHOCYTES NFR BLD AUTO: 31.7 %
MAN DIFF?: NORMAL
MCHC RBC-ENTMCNC: 30.9 PG
MCHC RBC-ENTMCNC: 33.5 GM/DL
MCV RBC AUTO: 92.2 FL
MONOCYTES # BLD AUTO: 0.35 K/UL
MONOCYTES NFR BLD AUTO: 6.8 %
NEUTROPHILS # BLD AUTO: 2.91 K/UL
NEUTROPHILS NFR BLD AUTO: 56.6 %
NONHDLC SERPL-MCNC: 122 MG/DL
PLATELET # BLD AUTO: 318 K/UL
POTASSIUM SERPL-SCNC: 4.4 MMOL/L
PROT SERPL-MCNC: 7.5 G/DL
RBC # BLD: 5.28 M/UL
RBC # FLD: 13.7 %
SODIUM SERPL-SCNC: 140 MMOL/L
TRIGL SERPL-MCNC: 136 MG/DL
TSH SERPL-ACNC: 2.08 UIU/ML
WBC # FLD AUTO: 5.14 K/UL

## 2024-06-20 NOTE — REASON FOR VISIT
[Patient preference] : as per patient preference [Continuing, patient seen in-person within last 12 months] : Telehealth services are continuing as patient has been seen in-person within last 12 months. [Telehealth (audio & video) - Individual/Group] : This visit was provided via telehealth using real-time 2-way audio visual technology. [Other Location: e.g. Home (Enter Location, City,State)___] : The provider was located at [unfilled]. [Home] : The patient, [unfilled], was located at home, [unfilled], at the time of the visit. [Verbal consent obtained from patient/other participant(s)] : Verbal consent for telehealth/telephonic services obtained from patient/other participant(s) [Patient] : Patient [FreeTextEntry4] : 1:00pm [FreeTextEntry5] : 2:00pm [FreeTextEntry1] : Supportive employment and educational services.

## 2024-06-20 NOTE — PLAN
[Skills training (all types)] : Skills training (all types)  [Other: ____] : [unfilled] [FreeTextEntry2] :  Shakila will create meaningful academic and occupational engagements.   [de-identified] : Shakila continues to engage weekly with the [employer] and  while working on his goal which is to create meaningful academic and occupational engagements. 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 Shakila reported that he had scheduled an interview for Monday. Shakila requested to apply for some more jobs with the [employer] and . Shakila was able to apply to a job with the [employer] and . Toward the end of the meeting, Shakila began to fall asleep during the session. [employer] and  observed that he was sleeping and suggested to cut the meeting short and continue another day. Shakila apologized and mentioned that he has been up late.   [FreeTextEntry1] : Shakila will meet with the [employer] and  again on 6/25.

## 2024-06-24 ENCOUNTER — APPOINTMENT (OUTPATIENT)
Dept: PSYCHIATRY | Facility: CLINIC | Age: 24
End: 2024-06-24

## 2024-07-25 ENCOUNTER — APPOINTMENT (OUTPATIENT)
Dept: PSYCHIATRY | Facility: CLINIC | Age: 24
End: 2024-07-25
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
## 2024-07-26 NOTE — REASON FOR VISIT
[Patient preference] : as per patient preference [Continuing, patient not seen in-person within last 12 months (provide details below)] : Telehealth services are continuing, patient not seen in-person within last 12 months.  [Telehealth (audio & video) - Individual/Group] : This visit was provided via telehealth using real-time 2-way audio visual technology. [Medical Office: ([facility])___] : The provider was located at the medical office in [unfilled]. [Home] : The patient, [unfilled], was located at home, [unfilled], at the time of the visit. [Verbal consent obtained from patient/other participant(s)] : Verbal consent for telehealth/telephonic services obtained from patient/other participant(s) [FreeTextEntry4] : 5617 [FreeTextEntry5] : 4056 [Patient] : Patient [FreeTextEntry1] : continued psychotherapy

## 2024-07-30 ENCOUNTER — APPOINTMENT (OUTPATIENT)
Dept: PSYCHIATRY | Facility: CLINIC | Age: 24
End: 2024-07-30

## 2024-07-30 ENCOUNTER — OUTPATIENT (OUTPATIENT)
Dept: OUTPATIENT SERVICES | Facility: HOSPITAL | Age: 24
LOS: 1 days | End: 2024-07-30
Payer: COMMERCIAL

## 2024-07-30 DIAGNOSIS — F20.9 SCHIZOPHRENIA, UNSPECIFIED: ICD-10-CM

## 2024-07-30 DIAGNOSIS — F33.1 MAJOR DEPRESSIVE DISORDER, RECURRENT, MODERATE: ICD-10-CM

## 2024-07-30 PROCEDURE — 90837 PSYTX W PT 60 MINUTES: CPT

## 2024-07-31 DIAGNOSIS — F20.9 SCHIZOPHRENIA, UNSPECIFIED: ICD-10-CM

## 2024-07-31 DIAGNOSIS — F33.1 MAJOR DEPRESSIVE DISORDER, RECURRENT, MODERATE: ICD-10-CM

## 2024-08-02 ENCOUNTER — APPOINTMENT (OUTPATIENT)
Dept: PSYCHIATRY | Facility: CLINIC | Age: 24
End: 2024-08-02

## 2024-08-06 ENCOUNTER — APPOINTMENT (OUTPATIENT)
Dept: PSYCHIATRY | Facility: CLINIC | Age: 24
End: 2024-08-06

## 2024-08-07 ENCOUNTER — APPOINTMENT (OUTPATIENT)
Dept: PSYCHIATRY | Facility: CLINIC | Age: 24
End: 2024-08-07

## 2024-08-08 ENCOUNTER — OUTPATIENT (OUTPATIENT)
Dept: OUTPATIENT SERVICES | Facility: HOSPITAL | Age: 24
LOS: 1 days | End: 2024-08-08
Payer: COMMERCIAL

## 2024-08-08 ENCOUNTER — APPOINTMENT (OUTPATIENT)
Dept: PSYCHIATRY | Facility: CLINIC | Age: 24
End: 2024-08-08

## 2024-08-08 PROCEDURE — 90832 PSYTX W PT 30 MINUTES: CPT | Mod: 93

## 2024-08-09 ENCOUNTER — OUTPATIENT (OUTPATIENT)
Dept: OUTPATIENT SERVICES | Facility: HOSPITAL | Age: 24
LOS: 1 days | End: 2024-08-09
Payer: COMMERCIAL

## 2024-08-09 ENCOUNTER — APPOINTMENT (OUTPATIENT)
Dept: PSYCHIATRY | Facility: CLINIC | Age: 24
End: 2024-08-09

## 2024-08-09 DIAGNOSIS — F33.1 MAJOR DEPRESSIVE DISORDER, RECURRENT, MODERATE: ICD-10-CM

## 2024-08-09 DIAGNOSIS — F12.20 CANNABIS DEPENDENCE, UNCOMPLICATED: ICD-10-CM

## 2024-08-09 DIAGNOSIS — F20.9 SCHIZOPHRENIA, UNSPECIFIED: ICD-10-CM

## 2024-08-09 PROCEDURE — 99214 OFFICE O/P EST MOD 30 MIN: CPT | Mod: 95

## 2024-08-09 NOTE — PHYSICAL EXAM
[Cooperative] : cooperative [Euthymic] : euthymic [Full] : full [Clear] : clear [Linear/Goal Directed] : linear/goal directed [Average] : average [WNL] : within normal limits [Well groomed] : well groomed [FreeTextEntry1] : wearing hat [FreeTextEntry8] : "pretty good"

## 2024-08-09 NOTE — DISCUSSION/SUMMARY
[FreeTextEntry1] : Shakila Pacheco is 24 years old, , English speaking, single , , domiciled with his mother, with a history of having been diagnosed with schizophrenia. He was referred to [facility] program after his first IPP in [facility] for severe mood episode with psychotic features with resolved with addition of Olanzapine . Per the intake record, there might be another depressive episode that patient is experienced few months prior to his admission to [facility] ( reported by mother- depressed, withdwarn, excessive sleep and low appetite) Patient was diagnosed with Schizophreniform disorder and now given the duration of symptoms meets criteria for schizophrenia , however, considering his family history and his clinical presentation, Bipolar 1 disorder cannot be excluded at this time. Patient demonstrated relatively good control of active psychotic and mood symptoms on olanzapine 20 mg but complained of feeling very tired during the day and expressed concerns over increased appetite and medication has been lowered to olanzapine 15mg gradually.    On evaluation today, Shakila reports his mood has been stable and euthymic. He denied symptoms consistent with psychosis. He reports weight loss and better energy during the day since switching his medication from Olanzapine to Abilify. He denied safety concerns such as thoughts of harming himself or others. He reports willingness for and understanding of the current medication plan.

## 2024-08-09 NOTE — HISTORY OF PRESENT ILLNESS
[FreeTextEntry2] : Pt reports he was previously hospitalized [place] for having thoughts of not wanting to be alive. See previous history/records.  recently admitted at EOB at [facility] : 7/19/22-7/21/22 after he presented himself to the ED for bruising. While he was in the EOB he was started on Depakene 1500mg at bedtime and Olanzapine was increased to 20mg at bedtime.  [FreeTextEntry1] : Pt presents for scheduled medication management follow up. Seen in coverage for Dr Gallego. He reports doing well overall. Notes stable mood, denies acute depressive, manic or psychotic symptoms. He reports taking his medications regularly, monitored by his mother. He notes improvement since being on this medication regimen, feels he is able to go out more and be more social. He is currently visiting a cousin. He denies any complaints today, requests med refill.  [FreeTextEntry3] : Olanzapine Abilify (current) Lexapro (current)

## 2024-08-09 NOTE — REASON FOR VISIT
[Patient preference] : as per patient preference [Telehealth (audio & video) - Individual/Group] : This visit was provided via telehealth using real-time 2-way audio visual technology. [Home] : The patient, [unfilled], was located at home, [unfilled], at the time of the visit. [Verbal consent obtained from patient/other participant(s)] : Verbal consent for telehealth/telephonic services obtained from patient/other participant(s) [Patient] : Patient [Continuity of care] : to ensure continuity of care [Other Location: e.g. Home (Enter Location, City,State)___] : The provider was located at [unfilled].

## 2024-08-09 NOTE — PLAN
[Medication education provided] : Medication education provided. [Rationale for medication choices, possible risks/precautions, benefits, alternative treatment choices, and consequences of non-treatment discussed] : Rationale for medication choices, possible risks/precautions, benefits, alternative treatment choices, and consequences of non-treatment discussed with patient/family/caregiver  [No] : No [FreeTextEntry4] : Goal 1: Prevent reoccurrence of psychosis and maintain health.  Goal keyword or goal statement: identify antecedents to psychosis Assessment of progress on goal/objective: Shakila has identified that stress of dating has been a trigger to feelings of depression and isolation. He is currently involved in a relationship and he states this has been "good for me." However, there are other girls that are interested in him and this makes balancing his work and personal life difficult and stressful. 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 Shakila Ba's mother is no longer able to attend the family support meetings because she has returned to working in her office full-time. Jesenia remains involved and reaches out to the team when needed. Shakila states he is struggling with incorporating physical exercise into his life. Shakila is no longer considering a gym membership as he has a physically demanding job. ANTIONETTE and Shakila discussed maintaining contact with the team to add to consistency of his treatment.  [FreeTextEntry5] : Continue Metformin to 1000mg BID for weight gain due to psychotropic medication. Continue Abilify 15mg continue Lexapro 20mg daily, for a history of depression and anxiety Continue with [facility] therapist and [facility]

## 2024-08-10 DIAGNOSIS — F12.20 CANNABIS DEPENDENCE, UNCOMPLICATED: ICD-10-CM

## 2024-08-10 DIAGNOSIS — F20.9 SCHIZOPHRENIA, UNSPECIFIED: ICD-10-CM

## 2024-08-10 DIAGNOSIS — F33.1 MAJOR DEPRESSIVE DISORDER, RECURRENT, MODERATE: ICD-10-CM

## 2024-08-14 ENCOUNTER — APPOINTMENT (OUTPATIENT)
Dept: PSYCHIATRY | Facility: CLINIC | Age: 24
End: 2024-08-14

## 2024-08-14 NOTE — DISCUSSION/SUMMARY
[FreeTextEntry1] : pt inquiring about 90 day supply of lexapro Rx. writer confirmed that a 90 day supply was ordered. called pt's pharmacy as pt again called  inquiring about this issue. Per pharmacist, pt's insurance will no longer cover script at this retail pharmacy and he must receive his meds via [facility] mail order. Pharmacist suggested that pt can call his insurance and request a 1 time override as an exception, if needed. Writer will submit new scripts for all meds to [facility] mail order x 90 day supply.

## 2024-08-20 ENCOUNTER — OUTPATIENT (OUTPATIENT)
Dept: OUTPATIENT SERVICES | Facility: HOSPITAL | Age: 24
LOS: 1 days | End: 2024-08-20

## 2024-08-20 ENCOUNTER — APPOINTMENT (OUTPATIENT)
Dept: PSYCHIATRY | Facility: CLINIC | Age: 24
End: 2024-08-20

## 2024-08-20 DIAGNOSIS — F20.9 SCHIZOPHRENIA, UNSPECIFIED: ICD-10-CM

## 2024-08-21 ENCOUNTER — APPOINTMENT (OUTPATIENT)
Dept: PSYCHIATRY | Facility: CLINIC | Age: 24
End: 2024-08-21

## 2024-08-21 DIAGNOSIS — F20.9 SCHIZOPHRENIA, UNSPECIFIED: ICD-10-CM

## 2024-08-22 NOTE — PLAN
[Skills training (all types)] : Skills training (all types)  [de-identified] : I met with Shakila via [employer] for a session focused on developing skills to manage overthinking and intrusive thoughts. Shakila identified that when he struggles to leave his apartment, he often overthinks all the possible challenges he might encounter. He shared that when these thoughts arise, he finds it difficult not to react to them, typically choosing to avoid the situation rather than accepting the thoughts and continuing with his day. During the session, we reviewed two worksheets and developed a plan to help Shakila when he feels paralyzed by overthinking. Shakila was receptive and demonstrated his understanding through a "teach-back" method. [Recommended Frequency of Visits: ____] : Recommended frequency of visits: [unfilled] [FreeTextEntry1] : Next session is 08/28/2024 at 1500.

## 2024-08-22 NOTE — END OF VISIT
[Duration of Psychotherapy Visit (minutes spent in synchronous communication): ____] : Duration of Psychotherapy Visit (minutes spent in synchronous communication): [unfilled] [Individual Psychotherapy for 16-37 minutes] : Individual Psychotherapy for 16-37 minutes [Teletherapy Service Provided] : The services provided in this session were delivered via tele-therapy [FreeTextEntry3] : home [FreeTextEntry5] : [address] [place] [address]

## 2024-08-22 NOTE — PHYSICAL EXAM
[Cooperative] : cooperative [Euthymic] : euthymic [Full] : full [Clear] : clear [Linear/Goal Directed] : linear/goal directed [None Reported] : none reported [Average] : average [WNL] : within normal limits [Not applicable] : not applicable [FreeTextEntry7] : Shakila admits to feeling paranoid/fearful leaving his home.

## 2024-08-22 NOTE — REASON FOR VISIT
[Continuing, patient not seen in-person within last 12 months (provide details below)] : Telehealth services are continuing, patient not seen in-person within last 12 months.  [Telehealth (audio & video) - Individual/Group] : This visit was provided via telehealth using real-time 2-way audio visual technology. [Medical Office: ([facility])___] : The provider was located at the medical office in [unfilled]. [Other Location: e.g. Home (Enter Location, City,State)___] : The patient, [unfilled], was located at [unfilled] at the time of the visit. [Verbal consent obtained from patient/other participant(s)] : Verbal consent for telehealth/telephonic services obtained from patient/other participant(s) [FreeTextEntry4] : 1500 [FreeTextEntry5] : 0556 [Patient] : Patient [FreeTextEntry1] : continued psychotherapy

## 2024-08-23 NOTE — PLAN
[FreeTextEntry2] :  Shakila will create meaningful academic and occupational engagements.  [Other: ____] : [unfilled] [Skills training (all types)] : Skills training (all types)  [de-identified] : Shakila continues to engage weekly with the [employer] and  while working on his goal which is to create meaningful academic and occupational engagements. Shakila requested that the Supported Employment and  assist him with completing the SSA application for SSI/SSDI. Shakila reported that he started this with MsSharifa Yamileth, his therapist but was unable to get far. Supported Employment and  explained that since someone else had started this application that he would need to start over again to complete this. Supported Employment and  assisted Shakila with completing a new application and was able to submit it. Supported Employment and  explained that typical next step would be that they call him for an interview in which he opt to have an [facility] staff join in to advocate on his behalf. Shakila will keep the Supported Employment and  posted with any updates from this application.  [Recommended Frequency of Visits: ____] : Recommended frequency of visits: [unfilled] [Return in ____ week(s)] : Return in [unfilled] week(s) [FreeTextEntry1] : Shakila will meet with the Supported Employment and  again on 8/27.

## 2024-08-23 NOTE — REASON FOR VISIT
[Patient preference] : as per patient preference [Continuing, patient seen in-person within last 12 months] : Telehealth services are continuing as patient has been seen in-person within last 12 months. [Telehealth (audio & video) - Individual/Group] : This visit was provided via telehealth using real-time 2-way audio visual technology. [Other Location: e.g. Home (Enter Location, City,State)___] : The provider was located at [unfilled]. [Home] : The patient, [unfilled], was located at home, [unfilled], at the time of the visit. [Verbal consent obtained from patient/other participant(s)] : Verbal consent for telehealth/telephonic services obtained from patient/other participant(s) [FreeTextEntry4] : 1:40pm [FreeTextEntry5] : 3:20pm [Patient] : Patient [FreeTextEntry1] : Supportive employment and educational services.

## 2024-08-23 NOTE — END OF VISIT
[Prolonged Visit (90 minutes or longer)] : Prolonged Visit (90 minutes or longer) [Teletherapy Service Provided] : The services provided in this session were delivered via tele-therapy [FreeTextEntry2] : Completed SSi/SSDI application which was lengthy. [FreeTextEntry3] : Home [FreeTextEntry5] : Home-Remote Work

## 2024-08-27 ENCOUNTER — OUTPATIENT (OUTPATIENT)
Dept: OUTPATIENT SERVICES | Facility: HOSPITAL | Age: 24
LOS: 1 days | End: 2024-08-27

## 2024-08-27 ENCOUNTER — APPOINTMENT (OUTPATIENT)
Dept: PSYCHIATRY | Facility: CLINIC | Age: 24
End: 2024-08-27

## 2024-08-27 DIAGNOSIS — F20.9 SCHIZOPHRENIA, UNSPECIFIED: ICD-10-CM

## 2024-08-28 ENCOUNTER — APPOINTMENT (OUTPATIENT)
Dept: PSYCHIATRY | Facility: CLINIC | Age: 24
End: 2024-08-28

## 2024-08-28 ENCOUNTER — OUTPATIENT (OUTPATIENT)
Dept: OUTPATIENT SERVICES | Facility: HOSPITAL | Age: 24
LOS: 1 days | End: 2024-08-28
Payer: COMMERCIAL

## 2024-08-28 DIAGNOSIS — F25.1 SCHIZOAFFECTIVE DISORDER, DEPRESSIVE TYPE: ICD-10-CM

## 2024-08-28 DIAGNOSIS — F20.9 SCHIZOPHRENIA, UNSPECIFIED: ICD-10-CM

## 2024-08-28 PROCEDURE — 90834 PSYTX W PT 45 MINUTES: CPT

## 2024-08-29 DIAGNOSIS — F25.1 SCHIZOAFFECTIVE DISORDER, DEPRESSIVE TYPE: ICD-10-CM

## 2024-08-30 NOTE — PLAN
[de-identified] : The session focused on exploring why Shakila's mood seemed to have improved. 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 He expressed that he enjoys making money and feels better when he works because he can help his mom financially. Shakila acknowledged that his PTSD sometimes holds him back, particularly during moments when he felt anxious, especially when he had to leave the building to take out the garbage. He reported experiencing some memory lapses during these moments and also noticed some physical discomfort. We discussed techniques to help manage his anxiety, including paced breathing and grounding exercises. He was receptive to these suggestions, and we began to discuss addressing his trauma more directly. I reminded Shakila of the importance of being consistent with his attendance, and he agreed to do so [Recommended Frequency of Visits: ____] : Recommended frequency of visits: [unfilled] [Return in ____ week(s)] : Return in [unfilled] week(s) [FreeTextEntry1] : Next session will be 09/04/2024 at 1500.

## 2024-08-30 NOTE — REASON FOR VISIT
[Patient preference] : as per patient preference [Continuing, patient not seen in-person within last 12 months (provide details below)] : Telehealth services are continuing, patient not seen in-person within last 12 months.  [Telehealth (audio & video) - Individual/Group] : This visit was provided via telehealth using real-time 2-way audio visual technology. [Medical Office: ([facility])___] : The provider was located at the medical office in [unfilled]. [Home] : The patient, [unfilled], was located at home, [unfilled], at the time of the visit. [Verbal consent obtained from patient/other participant(s)] : Verbal consent for telehealth/telephonic services obtained from patient/other participant(s) [FreeTextEntry4] : 1958 [FreeTextEntry5] : 4036 [Patient] : Patient [FreeTextEntry1] : continued psychotherapy

## 2024-08-30 NOTE — END OF VISIT
[Duration of Psychotherapy Visit (minutes spent in synchronous communication): ____] : Duration of Psychotherapy Visit (minutes spent in synchronous communication): [unfilled] [Individual Psychotherapy for 38-52 minutes] : Individual Psychotherapy for 38 - 52 minutes [Teletherapy Service Provided] : The services provided in this session were delivered via tele-therapy [FreeTextEntry3] : home [FreeTextEntry5] : [address] [place] [address]

## 2024-08-30 NOTE — PHYSICAL EXAM
[Cooperative] : cooperative [Euthymic] : euthymic [Full] : full [Clear] : clear [Linear/Goal Directed] : linear/goal directed [None] : none [None Reported] : none reported [Average] : average [WNL] : within normal limits [Not applicable] : not applicable [FreeTextEntry8] : appears to have improved since our last session

## 2024-09-04 ENCOUNTER — APPOINTMENT (OUTPATIENT)
Dept: PSYCHIATRY | Facility: CLINIC | Age: 24
End: 2024-09-04

## 2024-09-04 ENCOUNTER — OUTPATIENT (OUTPATIENT)
Dept: OUTPATIENT SERVICES | Facility: HOSPITAL | Age: 24
LOS: 1 days | End: 2024-09-04
Payer: COMMERCIAL

## 2024-09-04 DIAGNOSIS — F20.9 SCHIZOPHRENIA, UNSPECIFIED: ICD-10-CM

## 2024-09-04 DIAGNOSIS — F25.1 SCHIZOAFFECTIVE DISORDER, DEPRESSIVE TYPE: ICD-10-CM

## 2024-09-04 PROCEDURE — 90834 PSYTX W PT 45 MINUTES: CPT

## 2024-09-05 ENCOUNTER — APPOINTMENT (OUTPATIENT)
Dept: PSYCHIATRY | Facility: CLINIC | Age: 24
End: 2024-09-05

## 2024-09-05 DIAGNOSIS — F25.1 SCHIZOAFFECTIVE DISORDER, DEPRESSIVE TYPE: ICD-10-CM

## 2024-09-05 DIAGNOSIS — F20.9 SCHIZOPHRENIA, UNSPECIFIED: ICD-10-CM

## 2024-09-11 ENCOUNTER — OUTPATIENT (OUTPATIENT)
Dept: OUTPATIENT SERVICES | Facility: HOSPITAL | Age: 24
LOS: 1 days | End: 2024-09-11
Payer: COMMERCIAL

## 2024-09-11 ENCOUNTER — APPOINTMENT (OUTPATIENT)
Dept: PSYCHIATRY | Facility: CLINIC | Age: 24
End: 2024-09-11

## 2024-09-11 DIAGNOSIS — F25.1 SCHIZOAFFECTIVE DISORDER, DEPRESSIVE TYPE: ICD-10-CM

## 2024-09-11 PROCEDURE — 90832 PSYTX W PT 30 MINUTES: CPT

## 2024-09-12 ENCOUNTER — APPOINTMENT (OUTPATIENT)
Dept: PSYCHIATRY | Facility: CLINIC | Age: 24
End: 2024-09-12

## 2024-09-12 DIAGNOSIS — F25.1 SCHIZOAFFECTIVE DISORDER, DEPRESSIVE TYPE: ICD-10-CM
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
## 2024-09-12 NOTE — END OF VISIT
[Duration of Psychotherapy Visit (minutes spent in synchronous communication): ____] : Duration of Psychotherapy Visit (minutes spent in synchronous communication): [unfilled] [Individual Psychotherapy for 16-37 minutes] : Individual Psychotherapy for 16-37 minutes [Teletherapy Service Provided] : The services provided in this session were delivered via tele-therapy [FreeTextEntry5] : [address] [place] [address] [FreeTextEntry3] : home

## 2024-09-12 NOTE — REASON FOR VISIT
[Patient preference] : as per patient preference [Telehealth (audio & video) - Individual/Group] : This visit was provided via telehealth using real-time 2-way audio visual technology. [Medical Office: ([facility])___] : The provider was located at the medical office in [unfilled]. [Home] : The patient, [unfilled], was located at home, [unfilled], at the time of the visit. [FreeTextEntry4] : 5606 [FreeTextEntry5] : 4091 [Patient] : Patient [FreeTextEntry1] : continued psychotherapy

## 2024-09-13 ENCOUNTER — APPOINTMENT (OUTPATIENT)
Dept: PSYCHIATRY | Facility: CLINIC | Age: 24
End: 2024-09-13

## 2024-09-17 ENCOUNTER — APPOINTMENT (OUTPATIENT)
Dept: PSYCHIATRY | Facility: CLINIC | Age: 24
End: 2024-09-17

## 2024-09-17 ENCOUNTER — OUTPATIENT (OUTPATIENT)
Dept: OUTPATIENT SERVICES | Facility: HOSPITAL | Age: 24
LOS: 1 days | End: 2024-09-17

## 2024-09-17 DIAGNOSIS — F20.9 SCHIZOPHRENIA, UNSPECIFIED: ICD-10-CM

## 2024-09-17 NOTE — REASON FOR VISIT
[Patient preference] : as per patient preference [Continuing, patient seen in-person within last 12 months] : Telehealth services are continuing as patient has been seen in-person within last 12 months. [Telehealth (audio & video) - Individual/Group] : This visit was provided via telehealth using real-time 2-way audio visual technology. [Other Location: e.g. Home (Enter Location, City,State)___] : The provider was located at [unfilled]. [Home] : The patient, [unfilled], was located at home, [unfilled], at the time of the visit. [Patient's space is appropriate for telehealth and maintains privacy/confidentiality.] : Patient's space is appropriate for telehealth and maintains privacy/confidentiality. [Participant(s) identity verified] : Participant(s) identity verified. [FreeTextEntry4] : 1:00pm [FreeTextEntry5] : 1:45pm [Patient] : Patient [FreeTextEntry1] : Supportive employment and educational services.

## 2024-09-17 NOTE — PLAN
[FreeTextEntry2] :  Shakila will create meaningful academic and occupational engagements.  [Skills training (all types)] : Skills training (all types)  [Other: ____] : [unfilled] [de-identified] : Shakila continues to engage weekly with the Supported Employment and  while working on his goal which is to create meaningful academic and occupational engagements. Shakila reported that he continue to attend work when they offer him hours. Shakila mentioned that he did not work last weekend because he was sick. Shakila reported that he still has not heard back form the SSA office, in which Supported Employment and  checked into the status of his application and it shows that it is still in review, Shakila requested assistance with completing an application for a place called [employer] because he has a deep passion for what they do and feels he would greatly benefit from their mission in helping him be the  that he aspires to be. Supported Employment and  explained that there is no given place to apply online, however suggesting that he reach out via email to both the stores to inquire whether they would be hiring for any position within the store to get his foot in the door. Supported Employment and  also saw another position, however Shakila was apprehensive about applying because he did not have retail experience. 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 Supported Employment and  suggested enhancing his resume to reflect his current position he is working at and also to add on qualities that this new position would see and make him more marketable. Shakila was in agreement and has been putting his own efforts into exploring work options. [Recommended Frequency of Visits: ____] : Recommended frequency of visits: [unfilled] [Return in ____ week(s)] : Return in [unfilled] week(s) [FreeTextEntry1] : Shakila will meet with the Supported Employment and  again on 9/24.

## 2024-09-18 ENCOUNTER — APPOINTMENT (OUTPATIENT)
Dept: PSYCHIATRY | Facility: CLINIC | Age: 24
End: 2024-09-18

## 2024-09-18 DIAGNOSIS — F20.9 SCHIZOPHRENIA, UNSPECIFIED: ICD-10-CM

## 2024-09-23 NOTE — PLAN
[Exposure +/- Response Prevention] : Exposure +/- Response Prevention  [Psychoeducation] : Psychoeducation  [Skills training (all types)] : Skills training (all types)  [Recommended Frequency of Visits: ____] : Recommended frequency of visits: [unfilled] [Return in ____ week(s)] : Return in [unfilled] week(s) [de-identified] : Met with Shakila via [employer] for a session focused on the progress he has made through self-guided exposure therapy. Shakila has been able to leave his home without an escort and tolerates his anxiety by continuing to walk. However, he finds it more challenging to manage anxiety when completing tasks in one place outdoors, often rushing through them to return indoors, where he feels safer. I praised Shakila for his follow-through and the progress he's made. He shared that he is now able to accept work in different areas, which has helped him earn more money. I suggested to Shakila that he might benefit from using grounding exercises and self-talk when facing these challenges.  He was receptive.   [FreeTextEntry1] : Next session will be 09/25/2024 at 1500.  Shakila to try to utilize grounding exercise or self-talk when feeling anxious.

## 2024-09-23 NOTE — REASON FOR VISIT
[Patient preference] : as per patient preference [Continuing, patient not seen in-person within last 12 months (provide details below)] : Telehealth services are continuing, patient not seen in-person within last 12 months.  [Telehealth (audio & video) - Individual/Group] : This visit was provided via telehealth using real-time 2-way audio visual technology. [Medical Office: ([facility])___] : The provider was located at the medical office in [unfilled]. [Home] : The patient, [unfilled], was located at home, [unfilled], at the time of the visit. [Patient's space is appropriate for telehealth and maintains privacy/confidentiality.] : Patient's space is appropriate for telehealth and maintains privacy/confidentiality. [Participant(s) identity verified] : Participant(s) identity verified. [Patient] : Patient [FreeTextEntry4] : 9379 [FreeTextEntry5] : 0966 [FreeTextEntry1] : continued psychotherapy

## 2024-09-23 NOTE — END OF VISIT
[Duration of Psychotherapy Visit (minutes spent in synchronous communication): ____] : Duration of Psychotherapy Visit (minutes spent in synchronous communication): [unfilled] [Individual Psychotherapy for 16-37 minutes] : Individual Psychotherapy for 16-37 minutes [Teletherapy Service Provided] : The services provided in this session were delivered via tele-therapy [FreeTextEntry3] : home [FreeTextEntry5] : [address] [place] [address]

## 2024-09-24 ENCOUNTER — APPOINTMENT (OUTPATIENT)
Dept: PSYCHIATRY | Facility: CLINIC | Age: 24
End: 2024-09-24

## 2024-09-24 ENCOUNTER — OUTPATIENT (OUTPATIENT)
Dept: OUTPATIENT SERVICES | Facility: HOSPITAL | Age: 24
LOS: 1 days | End: 2024-09-24

## 2024-09-24 DIAGNOSIS — F20.9 SCHIZOPHRENIA, UNSPECIFIED: ICD-10-CM

## 2024-09-25 ENCOUNTER — APPOINTMENT (OUTPATIENT)
Dept: PSYCHIATRY | Facility: CLINIC | Age: 24
End: 2024-09-25

## 2024-09-25 DIAGNOSIS — F20.9 SCHIZOPHRENIA, UNSPECIFIED: ICD-10-CM

## 2024-09-27 ENCOUNTER — APPOINTMENT (OUTPATIENT)
Dept: PSYCHIATRY | Facility: CLINIC | Age: 24
End: 2024-09-27

## 2024-09-27 NOTE — REASON FOR VISIT
[Patient preference] : as per patient preference [Continuing, patient seen in-person within last 12 months] : Telehealth services are continuing as patient has been seen in-person within last 12 months. [Telehealth (audio & video) - Individual/Group] : This visit was provided via telehealth using real-time 2-way audio visual technology. [Other Location: e.g. Home (Enter Location, City,State)___] : The patient, [unfilled], was located at [unfilled] at the time of the visit. [Patient's space is appropriate for telehealth and maintains privacy/confidentiality.] : Patient's space is appropriate for telehealth and maintains privacy/confidentiality. [Participant(s) identity verified] : Participant(s) identity verified. [FreeTextEntry4] : 1:00pm [FreeTextEntry5] : 1:40pm [Patient] : Patient [FreeTextEntry1] : Supportive employment and educational services.

## 2024-09-27 NOTE — PLAN
[FreeTextEntry2] :  Shakila will create meaningful academic and occupational engagements.  [Skills training (all types)] : Skills training (all types)  [Other: ____] : [unfilled] [de-identified] : Shakila continues to engage weekly with the [employer] and  while working on his goal which is to create meaningful academic and occupational engagements. Shakila reported that he was currently staying with his cousin for a week in [place]. [employer] and  questioned how work has been going and whether he has been still looking for employment at this time to supplement his time for the per [employer] work he was currently doing. Shakila reported that he has not looked for work and mentioned that he wanted to join the [facility] [employer]. [employer] and  questioned whether Shakila was registered to take the exam which is the first step. Shakila was unsure if he was registered, worked with the [employer] and  on exploring whether an application was submitted. [employer] and  discovered that he had not completed an application and questioned whether he was interested in completing one at that time. Shakila was ambivalent around applying for this and was unsure what he wanted to do. [employer] and  explained that the deadline to apply was on 9/29 and needed to make a decision whether he wanted to complete an application. Shakila mentioned that he will check back in with the [employer] and  on 9/27 to make a better decision. [employer] and  discussed graduation with Shakila. Shakila was uncertain about celebrating his graduation, adding that he can't see celebrating being down on his mood and not productive with his time. [employer] and  explained that the program is short term and everyone builds upon their successes at different times, adding that he has had some great accomplishments that he should be proud of. Shakila mentioned that he will think about coming to his graduation from [employer]. [Recommended Frequency of Visits: ____] : Recommended frequency of visits: [unfilled] [Return in ____ week(s)] : Return in [unfilled] week(s) [FreeTextEntry1] : Shakila will meet with the [employer] and  again on 9/27.

## 2024-09-27 NOTE — END OF VISIT
[Individual Psychotherapy for 38-52 minutes] : Individual Psychotherapy for 38 - 52 minutes [Teletherapy Service Provided] : The services provided in this session were delivered via tele-therapy [FreeTextEntry3] : Home [FreeTextEntry5] : at cousins home in [place]

## 2024-10-01 ENCOUNTER — APPOINTMENT (OUTPATIENT)
Dept: PSYCHIATRY | Facility: CLINIC | Age: 24
End: 2024-10-01

## 2024-10-01 ENCOUNTER — OUTPATIENT (OUTPATIENT)
Dept: OUTPATIENT SERVICES | Facility: HOSPITAL | Age: 24
LOS: 1 days | End: 2024-10-01

## 2024-10-01 DIAGNOSIS — F20.9 SCHIZOPHRENIA, UNSPECIFIED: ICD-10-CM

## 2024-10-01 NOTE — REASON FOR VISIT
[Patient preference] : as per patient preference [Continuing, patient seen in-person within last 12 months] : Telehealth services are continuing as patient has been seen in-person within last 12 months. [Telehealth (audio & video) - Individual/Group] : This visit was provided via telehealth using real-time 2-way audio visual technology. [Other Location: e.g. Home (Enter Location, City,State)___] : The provider was located at [unfilled]. [Home] : The patient, [unfilled], was located at home, [unfilled], at the time of the visit. [Patient's space is appropriate for telehealth and maintains privacy/confidentiality.] : Patient's space is appropriate for telehealth and maintains privacy/confidentiality. [Participant(s) identity verified] : Participant(s) identity verified. [FreeTextEntry4] : 1:00pm [FreeTextEntry5] : 2:00pm [Patient] : Patient [FreeTextEntry1] : [employer].

## 2024-10-01 NOTE — PLAN
[FreeTextEntry2] :  Shakila will create meaningful academic and occupational engagements.  [Skills training (all types)] : Skills training (all types)  [Other: ____] : [unfilled] [de-identified] : Shakila continues to engage weekly with the [employer] and  while working on his goal which is to create meaningful academic and occupational engagements. Shakila reported that he was home from his cousins house in [place]. Shakila mentioned that he was asked to work but it was last minute and declined. Shakila advocated for himself through effective communication and respectfully asked if they would be able to provide him with a little more advanced notice so he can get there. Shakila reported that he was unsure how to complete the SSI/SSDI paperwork that came to him in the mail. [employer] and  was able to check into the status of his application and discovered that he was denied for disability but was still eligible for SSDI. [employer] and  attempted to complete the work activity report with Shakila so that he can submit the documentation needed for his application. Shakila struggled with remembering his work history but was able to look back for some of his pay stubs. [employer] and  suggested that he print out all of his pay stubs he had so that he can attach them with the form when he submits this. Shakila agreed to find the remainder of the pay stubs by next meeting. [Recommended Frequency of Visits: ____] : Recommended frequency of visits: [unfilled] [Return in ____ week(s)] : Return in [unfilled] week(s) [FreeTextEntry1] : [employer] and  will follow back up with Shakila on 10/4 to complete the form for SSA.

## 2024-10-02 ENCOUNTER — APPOINTMENT (OUTPATIENT)
Dept: PSYCHIATRY | Facility: CLINIC | Age: 24
End: 2024-10-02

## 2024-10-02 DIAGNOSIS — F20.9 SCHIZOPHRENIA, UNSPECIFIED: ICD-10-CM

## 2024-10-02 NOTE — PHYSICAL EXAM
[Well groomed] : well groomed [Cooperative] : cooperative [Euthymic] : euthymic [Full] : full [Clear] : clear [Linear/Goal Directed] : linear/goal directed [None] : none [None Reported] : none reported [Average] : average [WNL] : within normal limits [Not applicable] : not applicable [FreeTextEntry5] : a little distracted  [de-identified] : has some minor difficulties with seeing other people's point of view

## 2024-10-02 NOTE — REASON FOR VISIT
[Patient preference] : as per patient preference [Continuing, patient not seen in-person within last 12 months (provide details below)] : Telehealth services are continuing, patient not seen in-person within last 12 months.  [Telehealth (audio & video) - Individual/Group] : This visit was provided via telehealth using real-time 2-way audio visual technology. [Other Location: e.g. Home (Enter Location, City,State)___] : The provider was located at [unfilled]. [Home] : The patient, [unfilled], was located at home, [unfilled], at the time of the visit. [Patient's space is appropriate for telehealth and maintains privacy/confidentiality.] : Patient's space is appropriate for telehealth and maintains privacy/confidentiality. [Participant(s) identity verified] : Participant(s) identity verified. [FreeTextEntry4] : 0388 [FreeTextEntry5] : 8594 [Patient] : Patient [FreeTextEntry1] : continued psychotherapy

## 2024-10-02 NOTE — PLAN
[Interpersonal Therapy] : Interpersonal Therapy  [Other: ____] : [unfilled] [de-identified] : I met with Shakila via [employer] for a session that focused on completing the  assessment. As we went through the questions, it sparked a conversation about graduation, and Shakila expressed some reservations about moving on. He shared concerns about the challenge of explaining his history to a new therapist, feeling that it would be too taxing. I explained the importance of sharing his history and how it impacts treatment, using the analogy of providing information to a new doctor. This seemed to ease his distress, and he acknowledged that his anxiety was more about the change itself. We agreed that change can be difficult, but I reminded Shakila that he has successfully navigated many changes throughout his life. I also emphasized that he is celebrating his resilience and will continue to thrive if he makes his recovery a priority, which he found reassuring.  Lastly, we discussed an interaction Shakila had with a former friend who wasn't there for him during his mental health struggles. When I encouraged him to consider the friend's perspective, Shakila requested to move on from the topic. [Recommended Frequency of Visits: ____] : Recommended frequency of visits: [unfilled] [Return in ____ week(s)] : Return in [unfilled] week(s) [FreeTextEntry1] : Next session is 10/09/2024 at 1500, virtual.

## 2024-10-04 ENCOUNTER — APPOINTMENT (OUTPATIENT)
Dept: PSYCHIATRY | Facility: CLINIC | Age: 24
End: 2024-10-04

## 2024-10-04 ENCOUNTER — OUTPATIENT (OUTPATIENT)
Dept: OUTPATIENT SERVICES | Facility: HOSPITAL | Age: 24
LOS: 1 days | End: 2024-10-04

## 2024-10-04 DIAGNOSIS — F20.9 SCHIZOPHRENIA, UNSPECIFIED: ICD-10-CM

## 2024-10-05 DIAGNOSIS — F20.9 SCHIZOPHRENIA, UNSPECIFIED: ICD-10-CM

## 2024-10-07 NOTE — PLAN
[FreeTextEntry2] :  Shakila will create meaningful academic and occupational engagements.  [Skills training (all types)] : Skills training (all types)  [Other: ____] : [unfilled] [de-identified] : Shakila continues to engage weekly with the [employer] and  while working on his goal which is to create meaningful academic and occupational engagements. [employer] and  checked in with Shakila regarding whether he was able to gather his documents for his SSA documentation. Shakila reported that he was able to retrieve his W2's from past work experiences and also has some pay stubs as well. [employer] and  assisted Shakila with completing the remainder of the document and also advised Shakila to print it out and send this in so that they can begin to work on this when they receive the information from him. Shakila wrote down what he needed to do and mentioned that he would send this information out by the weekend. [employer] and  plans on checking in with Shakila next week to see whether he sent these documents in. [Recommended Frequency of Visits: ____] : Recommended frequency of visits: [unfilled] [Return in ____ week(s)] : Return in [unfilled] week(s) [FreeTextEntry1] : Shakila will meet with the [employer] and  again on 10/8.

## 2024-10-07 NOTE — PLAN
[FreeTextEntry2] :  Shakila will create meaningful academic and occupational engagements.  [Skills training (all types)] : Skills training (all types)  [Other: ____] : [unfilled] [de-identified] : Shakila continues to engage weekly with the [employer] and  while working on his goal which is to create meaningful academic and occupational engagements. [employer] and  checked in with Shakila regarding whether he was able to gather his documents for his SSA documentation. Shakila reported that he was able to retrieve his W2's from past work experiences and also has some pay stubs as well. [employer] and  assisted Shakila with completing the remainder of the document and also advised Shakila to print it out and send this in so that they can begin to work on this when they receive the information from him. Shakila wrote down what he needed to do and mentioned that he would send this information out by the weekend. [employer] and  plans on checking in with Shakila next week to see whether he sent these documents in. [Recommended Frequency of Visits: ____] : Recommended frequency of visits: [unfilled] [Return in ____ week(s)] : Return in [unfilled] week(s) [FreeTextEntry1] : Shakila will meet with the [employer] and  again on 10/8.

## 2024-10-08 ENCOUNTER — OUTPATIENT (OUTPATIENT)
Dept: OUTPATIENT SERVICES | Facility: HOSPITAL | Age: 24
LOS: 1 days | End: 2024-10-08

## 2024-10-08 ENCOUNTER — APPOINTMENT (OUTPATIENT)
Dept: PSYCHIATRY | Facility: CLINIC | Age: 24
End: 2024-10-08

## 2024-10-08 DIAGNOSIS — F20.9 SCHIZOPHRENIA, UNSPECIFIED: ICD-10-CM

## 2024-10-09 ENCOUNTER — APPOINTMENT (OUTPATIENT)
Dept: PSYCHIATRY | Facility: CLINIC | Age: 24
End: 2024-10-09

## 2024-10-09 DIAGNOSIS — F20.9 SCHIZOPHRENIA, UNSPECIFIED: ICD-10-CM

## 2024-10-15 ENCOUNTER — APPOINTMENT (OUTPATIENT)
Dept: PSYCHIATRY | Facility: CLINIC | Age: 24
End: 2024-10-15

## 2024-10-16 ENCOUNTER — APPOINTMENT (OUTPATIENT)
Dept: PSYCHIATRY | Facility: CLINIC | Age: 24
End: 2024-10-16

## 2024-11-06 ENCOUNTER — APPOINTMENT (OUTPATIENT)
Dept: PSYCHIATRY | Facility: CLINIC | Age: 24
End: 2024-11-06

## 2024-11-13 ENCOUNTER — APPOINTMENT (OUTPATIENT)
Dept: PSYCHIATRY | Facility: CLINIC | Age: 24
End: 2024-11-13

## 2024-11-20 ENCOUNTER — APPOINTMENT (OUTPATIENT)
Dept: PSYCHIATRY | Facility: CLINIC | Age: 24
End: 2024-11-20

## 2024-12-11 ENCOUNTER — APPOINTMENT (OUTPATIENT)
Dept: PSYCHIATRY | Facility: CLINIC | Age: 24
End: 2024-12-11

## 2024-12-11 ENCOUNTER — OUTPATIENT (OUTPATIENT)
Dept: OUTPATIENT SERVICES | Facility: HOSPITAL | Age: 24
LOS: 1 days | End: 2024-12-11
Payer: COMMERCIAL

## 2024-12-11 ENCOUNTER — NON-APPOINTMENT (OUTPATIENT)
Age: 24
End: 2024-12-11

## 2024-12-11 DIAGNOSIS — F20.9 SCHIZOPHRENIA, UNSPECIFIED: ICD-10-CM

## 2024-12-11 PROCEDURE — 90832 PSYTX W PT 30 MINUTES: CPT

## 2024-12-12 DIAGNOSIS — F20.9 SCHIZOPHRENIA, UNSPECIFIED: ICD-10-CM

## 2024-12-18 ENCOUNTER — NON-APPOINTMENT (OUTPATIENT)
Age: 24
End: 2024-12-18

## 2024-12-18 ENCOUNTER — APPOINTMENT (OUTPATIENT)
Dept: PSYCHIATRY | Facility: CLINIC | Age: 24
End: 2024-12-18

## 2025-01-13 ENCOUNTER — APPOINTMENT (OUTPATIENT)
Dept: PSYCHIATRY | Facility: CLINIC | Age: 25
End: 2025-01-13

## 2025-01-13 ENCOUNTER — OUTPATIENT (OUTPATIENT)
Dept: OUTPATIENT SERVICES | Facility: HOSPITAL | Age: 25
LOS: 1 days | End: 2025-01-13

## 2025-01-13 DIAGNOSIS — F20.9 SCHIZOPHRENIA, UNSPECIFIED: ICD-10-CM

## 2025-01-14 ENCOUNTER — OUTPATIENT (OUTPATIENT)
Dept: OUTPATIENT SERVICES | Facility: HOSPITAL | Age: 25
LOS: 1 days | End: 2025-01-14

## 2025-01-14 ENCOUNTER — APPOINTMENT (OUTPATIENT)
Dept: PSYCHIATRY | Facility: CLINIC | Age: 25
End: 2025-01-14

## 2025-01-14 DIAGNOSIS — F20.9 SCHIZOPHRENIA, UNSPECIFIED: ICD-10-CM

## 2025-01-15 DIAGNOSIS — F20.9 SCHIZOPHRENIA, UNSPECIFIED: ICD-10-CM

## 2025-01-21 ENCOUNTER — OUTPATIENT (OUTPATIENT)
Dept: OUTPATIENT SERVICES | Facility: HOSPITAL | Age: 25
LOS: 1 days | End: 2025-01-21

## 2025-01-21 ENCOUNTER — APPOINTMENT (OUTPATIENT)
Dept: PSYCHIATRY | Facility: CLINIC | Age: 25
End: 2025-01-21

## 2025-01-21 DIAGNOSIS — F20.9 SCHIZOPHRENIA, UNSPECIFIED: ICD-10-CM

## 2025-01-22 DIAGNOSIS — F20.9 SCHIZOPHRENIA, UNSPECIFIED: ICD-10-CM

## 2025-01-27 ENCOUNTER — APPOINTMENT (OUTPATIENT)
Dept: PSYCHIATRY | Facility: CLINIC | Age: 25
End: 2025-01-27

## 2025-01-28 ENCOUNTER — NON-APPOINTMENT (OUTPATIENT)
Age: 25
End: 2025-01-28

## 2025-01-28 ENCOUNTER — APPOINTMENT (OUTPATIENT)
Dept: PSYCHIATRY | Facility: CLINIC | Age: 25
End: 2025-01-28

## 2025-01-28 ENCOUNTER — OUTPATIENT (OUTPATIENT)
Dept: OUTPATIENT SERVICES | Facility: HOSPITAL | Age: 25
LOS: 1 days | End: 2025-01-28

## 2025-01-28 DIAGNOSIS — F20.9 SCHIZOPHRENIA, UNSPECIFIED: ICD-10-CM

## 2025-01-29 DIAGNOSIS — F20.9 SCHIZOPHRENIA, UNSPECIFIED: ICD-10-CM

## 2025-02-03 ENCOUNTER — APPOINTMENT (OUTPATIENT)
Dept: PSYCHIATRY | Facility: CLINIC | Age: 25
End: 2025-02-03

## 2025-02-04 ENCOUNTER — APPOINTMENT (OUTPATIENT)
Dept: PSYCHIATRY | Facility: CLINIC | Age: 25
End: 2025-02-04

## 2025-02-05 ENCOUNTER — APPOINTMENT (OUTPATIENT)
Dept: PSYCHIATRY | Facility: CLINIC | Age: 25
End: 2025-02-05

## 2025-02-11 ENCOUNTER — APPOINTMENT (OUTPATIENT)
Dept: PSYCHIATRY | Facility: CLINIC | Age: 25
End: 2025-02-11

## 2025-02-11 ENCOUNTER — OUTPATIENT (OUTPATIENT)
Dept: OUTPATIENT SERVICES | Facility: HOSPITAL | Age: 25
LOS: 1 days | End: 2025-02-11

## 2025-02-11 DIAGNOSIS — F20.9 SCHIZOPHRENIA, UNSPECIFIED: ICD-10-CM

## 2025-02-12 DIAGNOSIS — F20.9 SCHIZOPHRENIA, UNSPECIFIED: ICD-10-CM

## 2025-02-18 ENCOUNTER — APPOINTMENT (OUTPATIENT)
Dept: PSYCHIATRY | Facility: CLINIC | Age: 25
End: 2025-02-18

## 2025-02-25 ENCOUNTER — NON-APPOINTMENT (OUTPATIENT)
Age: 25
End: 2025-02-25

## 2025-02-28 ENCOUNTER — APPOINTMENT (OUTPATIENT)
Dept: PSYCHIATRY | Facility: CLINIC | Age: 25
End: 2025-02-28

## 2025-03-03 ENCOUNTER — NON-APPOINTMENT (OUTPATIENT)
Age: 25
End: 2025-03-03

## 2025-03-05 ENCOUNTER — NON-APPOINTMENT (OUTPATIENT)
Age: 25
End: 2025-03-05

## 2025-03-05 ENCOUNTER — APPOINTMENT (OUTPATIENT)
Dept: PSYCHIATRY | Facility: CLINIC | Age: 25
End: 2025-03-05

## 2025-03-07 ENCOUNTER — APPOINTMENT (OUTPATIENT)
Dept: PSYCHIATRY | Facility: CLINIC | Age: 25
End: 2025-03-07

## 2025-03-07 ENCOUNTER — OUTPATIENT (OUTPATIENT)
Dept: OUTPATIENT SERVICES | Facility: HOSPITAL | Age: 25
LOS: 1 days | End: 2025-03-07

## 2025-03-07 DIAGNOSIS — F20.9 SCHIZOPHRENIA, UNSPECIFIED: ICD-10-CM

## 2025-03-08 DIAGNOSIS — F20.9 SCHIZOPHRENIA, UNSPECIFIED: ICD-10-CM

## 2025-03-10 ENCOUNTER — APPOINTMENT (OUTPATIENT)
Dept: PSYCHIATRY | Facility: CLINIC | Age: 25
End: 2025-03-10

## 2025-03-12 ENCOUNTER — NON-APPOINTMENT (OUTPATIENT)
Age: 25
End: 2025-03-12

## 2025-03-12 ENCOUNTER — APPOINTMENT (OUTPATIENT)
Dept: PSYCHIATRY | Facility: CLINIC | Age: 25
End: 2025-03-12

## 2025-03-13 ENCOUNTER — APPOINTMENT (OUTPATIENT)
Dept: PSYCHIATRY | Facility: CLINIC | Age: 25
End: 2025-03-13

## 2025-03-18 ENCOUNTER — OUTPATIENT (OUTPATIENT)
Dept: OUTPATIENT SERVICES | Facility: HOSPITAL | Age: 25
LOS: 1 days | End: 2025-03-18

## 2025-03-18 ENCOUNTER — APPOINTMENT (OUTPATIENT)
Dept: PSYCHIATRY | Facility: CLINIC | Age: 25
End: 2025-03-18

## 2025-03-18 DIAGNOSIS — F20.9 SCHIZOPHRENIA, UNSPECIFIED: ICD-10-CM

## 2025-03-19 ENCOUNTER — APPOINTMENT (OUTPATIENT)
Dept: PSYCHIATRY | Facility: CLINIC | Age: 25
End: 2025-03-19

## 2025-03-19 ENCOUNTER — NON-APPOINTMENT (OUTPATIENT)
Age: 25
End: 2025-03-19

## 2025-03-19 DIAGNOSIS — F20.9 SCHIZOPHRENIA, UNSPECIFIED: ICD-10-CM

## 2025-03-25 ENCOUNTER — APPOINTMENT (OUTPATIENT)
Dept: PSYCHIATRY | Facility: CLINIC | Age: 25
End: 2025-03-25

## 2025-03-25 ENCOUNTER — NON-APPOINTMENT (OUTPATIENT)
Age: 25
End: 2025-03-25

## 2025-03-26 ENCOUNTER — APPOINTMENT (OUTPATIENT)
Dept: PSYCHIATRY | Facility: CLINIC | Age: 25
End: 2025-03-26

## 2025-03-27 ENCOUNTER — NON-APPOINTMENT (OUTPATIENT)
Age: 25
End: 2025-03-27

## 2025-04-07 ENCOUNTER — NON-APPOINTMENT (OUTPATIENT)
Age: 25
End: 2025-04-07

## 2025-04-07 DIAGNOSIS — F33.1 MAJOR DEPRESSIVE DISORDER, RECURRENT, MODERATE: ICD-10-CM

## 2025-04-07 DIAGNOSIS — F20.9 SCHIZOPHRENIA, UNSPECIFIED: ICD-10-CM

## 2025-04-07 DIAGNOSIS — F12.20 CANNABIS DEPENDENCE, UNCOMPLICATED: ICD-10-CM
